# Patient Record
Sex: FEMALE | Race: WHITE
[De-identification: names, ages, dates, MRNs, and addresses within clinical notes are randomized per-mention and may not be internally consistent; named-entity substitution may affect disease eponyms.]

---

## 2018-07-10 ENCOUNTER — HOSPITAL ENCOUNTER (OUTPATIENT)
Dept: HOSPITAL 25 - ED | Age: 68
Setting detail: OBSERVATION
LOS: 1 days | Discharge: HOME | End: 2018-07-11
Attending: INTERNAL MEDICINE | Admitting: HOSPITALIST
Payer: MEDICARE

## 2018-07-10 DIAGNOSIS — Z79.82: ICD-10-CM

## 2018-07-10 DIAGNOSIS — R51: ICD-10-CM

## 2018-07-10 DIAGNOSIS — B19.20: ICD-10-CM

## 2018-07-10 DIAGNOSIS — F32.9: ICD-10-CM

## 2018-07-10 DIAGNOSIS — E11.65: ICD-10-CM

## 2018-07-10 DIAGNOSIS — E03.9: ICD-10-CM

## 2018-07-10 DIAGNOSIS — Z79.4: ICD-10-CM

## 2018-07-10 DIAGNOSIS — Z79.899: ICD-10-CM

## 2018-07-10 DIAGNOSIS — E86.0: Primary | ICD-10-CM

## 2018-07-10 DIAGNOSIS — Z88.8: ICD-10-CM

## 2018-07-10 DIAGNOSIS — G47.33: ICD-10-CM

## 2018-07-10 DIAGNOSIS — J44.9: ICD-10-CM

## 2018-07-10 DIAGNOSIS — K74.60: ICD-10-CM

## 2018-07-10 DIAGNOSIS — I10: ICD-10-CM

## 2018-07-10 DIAGNOSIS — N17.9: ICD-10-CM

## 2018-07-10 PROCEDURE — 94640 AIRWAY INHALATION TREATMENT: CPT

## 2018-07-10 PROCEDURE — 83036 HEMOGLOBIN GLYCOSYLATED A1C: CPT

## 2018-07-10 PROCEDURE — 99284 EMERGENCY DEPT VISIT MOD MDM: CPT

## 2018-07-10 PROCEDURE — 85610 PROTHROMBIN TIME: CPT

## 2018-07-10 PROCEDURE — 80053 COMPREHEN METABOLIC PANEL: CPT

## 2018-07-10 PROCEDURE — 96361 HYDRATE IV INFUSION ADD-ON: CPT

## 2018-07-10 PROCEDURE — 80048 BASIC METABOLIC PNL TOTAL CA: CPT

## 2018-07-10 PROCEDURE — 36415 COLL VENOUS BLD VENIPUNCTURE: CPT

## 2018-07-10 PROCEDURE — 83735 ASSAY OF MAGNESIUM: CPT

## 2018-07-10 PROCEDURE — 96360 HYDRATION IV INFUSION INIT: CPT

## 2018-07-10 PROCEDURE — G0378 HOSPITAL OBSERVATION PER HR: HCPCS

## 2018-07-10 PROCEDURE — 85730 THROMBOPLASTIN TIME PARTIAL: CPT

## 2018-07-10 PROCEDURE — 71045 X-RAY EXAM CHEST 1 VIEW: CPT

## 2018-07-10 PROCEDURE — 85025 COMPLETE CBC W/AUTO DIFF WBC: CPT

## 2018-07-10 PROCEDURE — 93005 ELECTROCARDIOGRAM TRACING: CPT

## 2018-07-11 VITALS — SYSTOLIC BLOOD PRESSURE: 162 MMHG | DIASTOLIC BLOOD PRESSURE: 64 MMHG

## 2018-07-11 LAB
BASOPHILS # BLD AUTO: 0 10^3/UL (ref 0–0.2)
EOSINOPHIL # BLD AUTO: 0.1 10^3/UL (ref 0–0.6)
HCT VFR BLD AUTO: 32 % (ref 35–47)
HGB BLD-MCNC: 10.5 G/DL (ref 12–16)
INR PPP/BLD: 1.02 (ref 0.77–1.02)
LYMPHOCYTES # BLD AUTO: 1.1 10^3/UL (ref 1–4.8)
MCH RBC QN AUTO: 26 PG (ref 27–31)
MCHC RBC AUTO-ENTMCNC: 32 G/DL (ref 31–36)
MCV RBC AUTO: 80 FL (ref 80–97)
MONOCYTES # BLD AUTO: 0.4 10^3/UL (ref 0–0.8)
NEUTROPHILS # BLD AUTO: 3.6 10^3/UL (ref 1.5–7.7)
NRBC # BLD AUTO: 0 10^3/UL
NRBC BLD QL AUTO: 0
PLATELET # BLD AUTO: 136 10^3/UL (ref 150–450)
RBC # BLD AUTO: 4.06 10^6/UL (ref 4–5.4)
WBC # BLD AUTO: 5.2 10^3/UL (ref 3.5–10.8)

## 2018-07-11 RX ADMIN — NYSTATIN SCH APPLIC: 100000 POWDER TOPICAL at 10:37

## 2018-07-11 RX ADMIN — NYSTATIN SCH APPLIC: 100000 POWDER TOPICAL at 05:53

## 2018-07-11 RX ADMIN — NYSTATIN SCH: 100000 POWDER TOPICAL at 14:54

## 2018-07-11 NOTE — RAD
HISTORY: SOB



COMPARISONS: October 04, 2016



VIEWS: 1: frontal portable view of the chest at 1:21 AM



FINDINGS:

LINES AND TUBES: None.

CARDIOMEDIASTINAL SILHOUETTE: The cardiomediastinal silhouette is normal for portable

technique.

PLEURA: The costophrenic angles are sharp. No pleural abnormalities are noted.

LUNG PARENCHYMA: The lungs are clear.

ABDOMEN: The upper abdomen is clear. There is no subphrenic gas.

BONES AND SOFT TISSUES: Degenerative changes are noted along the spine.



IMPRESSION: NO ACTIVE CARDIOPULMONARY DISEASE.



R0
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
## 2018-07-11 NOTE — ED
Neurological HPI





- HPI Summary


HPI Summary: 


Pt is 66 y/o female p/w R sided facial and thigh numbness c/o throbbing pain 

located on R-side of face radiating to the temple. Upon arrival, pain was rated 

an 8/10. Original pain onset last night and she has no prior similar episodes. 

Associated Sx:Pt denies fever, itching of abdominal region (erythema noted), 

chills. PMHx: Chronic hip problems, IDDM, CVA. Pt did not take gabapentin today 

and she uses a walker to ambulate regularly.





- History of Current Complaint


Chief Complaint: EDGeneral


Stated Complaint: FACIAL NUMBNESS


Time Seen by Provider: 07/10/18 23:25


Hx Obtained From: Patient


Onset/Duration: Started hours ago - Last night, Still Present


Current Severity: Severe


Neurological Deficit Location: Facial - R-sided numbness, RLE - Thigh numbness


Pain Intensity: 8


Pain Scale Used: 0-10 Numeric


Character: Throbbing


Aggravating: Nothing


Alleviating: Nothing


Associated Signs and Symptoms: Positive: Pain - R-side of face





- Additional Pertinent History


Primary Care Physician: [id number]





- Allergy/Home Medications


Allergies/Adverse Reactions: 


 Allergies











Allergy/AdvReac Type Severity Reaction Status Date / Time


 


MS Amoxicillin Allergy Mild rash, hives Verified 06/21/16 17:36





[From Augmentin]     


 


MS Cephalosporins Allergy Mild hives, rash Verified 06/21/16 17:36





[Cephalosporins]     


 


MS Clavulanic Acid Allergy Mild hives, rash Verified 06/21/16 17:36





[From Augmentin]     


 


MS Carbamazepine Allergy Unknown Unknown Verified 06/21/16 17:36





[From Tegretol]   Reaction  





   Details  


 


MS Metformin [Metformin] Allergy Unknown Unknown Verified 06/21/16 17:36





   Reaction  





   Details  














PMH/Surg Hx/FS Hx/Imm Hx


Previously Healthy: No


Endocrine/Hematology History: Reports: Hx Blood Transfusions, Hx Diabetes


   Denies: Hx Anticoagulant Therapy, Hx Blood Disorders, Hx Bone Marrow Disease

, Hx Systemic Lupus Erythematosus, Hx Sickle Cell Disease, Hx Thyroid Disease, 

Hx Anemia, Hx Unexplained Bleeding, Other Endocrine/Hematological Disorders


Cardiovascular History: Reports: Hx Cardiac Arrest, Hx Congestive Heart Failure

, Hx Coronary Artery Disease, Hx Hypercholesterolemia, Hx Hypertension, Hx 

Peripheral Vascular Disease, Other Cardiovascular Problems/Disorders - CAD


   Denies: Hx Aneurysm, Hx Angina, Hx Angioplasty, Hx Auto Implanted Cardiovert 

Defib, Hx Cardiomegaly, Hx Congenital Heart Disease, Hx Deep Vein Thrombosis, 

Hx Hypotension, Hx Myocardial Infarction, Hx Pacemaker/ICD, Hx Rheumatic Fever, 

Hx Syncope, Hx Valvular Heart Disease


Respiratory History: Reports: Hx Asthma, Hx Chronic Bronchitis, Hx Chronic 

Obstructive Pulmonary Disease (COPD), Hx Pneumonia, Hx Sleep Apnea, Other 

Respiratory Problems/Disorders - PNA


   Denies: Hx Cystic Fibrosis, Hx Lung Cancer, Hx Pleural Effusion, Hx 

Pulmonary Edema, Hx Pulmonary Embolism, Hx Seasonal Allergies


GI History: Reports: Hx Cirrhosis - HEPATITIS CIRRHOSIS, Hx Gall Bladder Disease


   Denies: Hx Crohn's Disease, Hx Diverticulosis, Hx Gastroesophageal Reflux 

Disease, Hx Gastrointestinal Bleed, Hx Hiatal Hernia, Hx Jaundice, Hx 

Obstructive Bowel, Hx Ileostomy, Hx Pyloric Stenosis, Hx Ulcer, Other GI 

Disorders


 History: 


   Denies: Hx Acute Renal Failure, Hx Benign Prostatic Hyperplasia, Hx Chronic 

Renal Failure, Hx Dialysis, Hx Kidney Infection, Hx Kidney Stones, Hx Renal 

Disease, Other  Problems/Disorders


Musculoskeletal History: Reports: Hx Arthritis - RIGHT HIP, Hx Back Problems


   Denies: Hx Rheumatoid Arthritis, Hx Bursitis, Hx Congenital Bone 

Abnormalities, Hx Fibromyalgia, Hx Gout, Hx Orthopedic Injury, Hx Osteoporosis, 

Hx Scoliosis, Hx Tendonitis, Other Musculoskeletal History


Sensory History: Reports: Hx Cataracts, Hx Contacts or Glasses, Hx Hearing Aid


   Denies: Hx Eye Injury, Hx Eye Prosthesis, Hx Glaucoma, Hx Macular 

Degeneration, Hx Vision Problem, Hx Deafness, Hx Hearing Problem, Other Sensory 

Impairments


Opthamlomology History: Reports: Hx Cataracts, Hx Contacts or Glasses


   Denies: Hx Eye Injury, Hx Eye Prosthesis, Hx Glaucoma, Hx Macular 

Degeneration, Hx Vision Problem, Other Sensory Impairments


Neurological History: Reports: Hx Nerve Disease - trigeminal neuralgia, Hx 

Transient Ischemic Attacks (TIA) - R/O


   Denies: Hx Dementia, Hx Developmental Delay, Hx Headaches, Hx Migraine, Hx 

Seizures, Hx Spinal Cord Injury, Other Neuro Impairments/Disorders


Psychiatric History: Reports: Hx Depression


   Denies: Hx Anxiety, Hx Attention Deficit Hyperactivity Disorder, Hx Eating 

Disorder, Hx Panic Disorder, Hx Post Traumatic Stress Disorder, Hx Inpatient 

Treatment, Hx Community Mental Health Tx, Hx Schizophrenia, Hx Bipolar Disorder

, Hx Suicide Attempt, Hx of Violent Episodes Against Others, Hx Substance Abuse

, Other Psychiatric Issues/Disorders





- Cancer History


Hx Chemotherapy: No





- Surgical History


Surgery Procedure, Year, and Place: hysterectomy, right leg skin graft,shonda, 

cataract


Hx Anesthesia Reactions: No





- Immunization History


Date of Tetanus Vaccine: Unknown


Date of Influenza Vaccine: Fall 2012


Infectious Disease History: No


Infectious Disease History: Reports: Hx Hepatitis - hep c, Hx of Known/

Suspected MRSA


   Denies: Hx Clostridium Difficile, Hx Human Immunodeficiency Virus (HIV), Hx 

Shingles, Hx Tuberculosis, Hx Known/Suspected VRE, Hx Known/Suspected VRSA, 

History Other Infectious Disease, Traveled Outside the [place] in Last 30 Days





- Family History


Known Family History: Positive: Cardiac Disease, Diabetes





- Social History


Occupation: Unemployed, Disabled


Lives: With Family - daughter


Alcohol Use: None


Hx Substance Use: No


Substance Use Type: Reports: None


Hx Tobacco Use: No


Smoking Status (MU): Never Smoked Tobacco


Have You Smoked in the Last Year: No





Review of Systems


Negative: Fever, Chills


Positive: Other - R sided facial pain radiating to the temple


Positive: Other - Erythema on abdomen without pruritis


Positive: Numbness - R-sided facial and thigh numbness


All Other Systems Reviewed And Are Negative: Yes





Physical Exam





- Summary


Physical Exam Summary: 


VITAL SIGNS: Reviewed.


GENERAL: Patient is a well-developed, obese female who is lying comfortable in 

the stretcher. Patient is not in any acute respiratory distress.


HEAD AND FACE: No signs of trauma. No ecchymosis, hematomas or skull 

depressions. R-side of face tender to palpation. 


EYES: PERRLA, EOMI x 2, No injected conjunctiva, no nystagmus.


EARS: Hearing grossly intact. Ear canals and tympanic membranes are within 

normal limits.


MOUTH: Oropharynx within normal limits.


NECK: Supple, trachea is midline, no adenopathy, no JVD, no carotid bruit, no c-

spine tenderness, neck with full ROM.


CHEST: Symmetric, no tenderness at palpation


LUNGS: Clear to auscultation bilaterally. No wheezing or crackles.


CVS: Regular rate and rhythm, S1 and S2 present, no murmurs or gallops 

appreciated.


ABDOMEN: Soft, non-tender, erythematous area on the R-side of abd that is not 

tender. No signs of distention. No rebound no guarding, and no masses palpated. 

Bowel sounds are normal.


EXTREMITIES: FROM in all major joints, no edema, no cyanosis or clubbing.


NEURO: Alert and oriented x 3. No acute neurological deficits. Speech is normal 

and follows commands. Grossly nonfocal. 


SKIN: Dry and warm. 


 


Triage Information Reviewed: Yes


Vital Signs On Initial Exam: 


 Initial Vitals











Temp Pulse Resp BP Pulse Ox


 


 98.7 F   81   20   176/69   93 


 


 07/10/18 23:26  07/10/18 23:26  07/10/18 23:26  07/10/18 23:26  07/10/18 23:26











Vital Signs Reviewed: Yes





Diagnostics





- Vital Signs


 Vital Signs











  Temp Pulse Resp BP Pulse Ox


 


 07/10/18 23:26  98.7 F  81  20  176/69  93














- Laboratory


Result Diagrams: 


 07/11/18 00:33





 07/11/18 00:33


Lab Statement: Any lab studies that have been ordered have been reviewed, and 

results considered in the medical decision making process.





- Radiology


  ** CXR


Xray Interpretation: No Acute Changes - No acute process. Pending official 

report.


Radiology Interpretation Completed By: ED Physician





- EKG


  ** 0134


Cardiac Rate: NL - 69 bpm


EKG Rhythm: Sinus Rhythm


EKG Interpretation: nml axis, nml interval, no ischemic changes.





Course/Dx





- Course


Assessment/Plan: Pt is 66 y/o female p/w R sided facial and thigh numbness c/o 

throbbing pain located on R-side of face radiating to the temple. Upon arrival, 

pain was rated an 8/10. Original pain onset last night and she has no prior 

similar episodes. Associated Sx:Pt denies fever, itching of abdominal region (

erythema noted), chills. CXR reveals negative results.  EKG reveals sinus 

rhythm w no ischemic changes. In the ED course, pt was given Insulin and 

Percocet. Patient will be admitted to [facility]. Pt is agreeable with this plan.





- Diagnoses


Provider Diagnoses: 


 Hyperglycemia








- Physician Notifications


Discussed Care Of Patient With: Sajan Hairston


Time Discussed With Above Provider: 01:40


Instructed by Provider To: Other - Accept pt for admission





Discharge





- Sign-Out/Discharge


Documenting (check all that apply): Patient Departure - Admit





- Discharge Plan


Condition: Fair


Disposition: ADMITTED TO [facility]


Referrals: 


Colton Norris MD [Primary Care Provider] -

## 2018-07-12 NOTE — DS
CC:  Dr. Norris.*

 

DISCHARGE SUMMARY:

 

DATE OF ADMISSION:  07/11/18

 

DATE OF DISCHARGE:  07/11/18

 

PRIMARY CARE PROVIDER:  Dr. Norris.

 

PRIMARY DIAGNOSES:

1.  Right facial pain.

2.  Hyperglycemia.

 

SECONDARY DIAGNOSES:  Include:

1.  Insulin dependent type 2 diabetes mellitus.

2.  Chronic obstructive pulmonary disease.

3.  Hypothyroidism.

4.  Hypertension.

5.  Obstructive sleep apnea.

6.  Depression.

 

MEDICATIONS ON DISCHARGE:  Unchanged from admission include:

1.  Zoloft 50 mg daily.

2.  Zofran 4 mg every 8 hours as needed.

3.  Nystatin cream daily.

4.  Multivitamin daily.

5.  Levothyroxine 25 mcg daily.

6.  Lactulose 30 mL 3 times daily.

7.  Insulin detemir 45 units in the morning and 50 units at night.

8.  Insulin aspart 28 units 2 times a day with meals.

9.  Narco 5/325 3 times a day as needed.

10.  Gabapentin 600 mg at bedtime.

11.  Advair 250/50 1 puff twice daily.

12.  Docusate 100 mg twice daily.

13.  Clopidogrel 75 mg daily.

14.  Caltrate 1 tab twice daily.

15.  Bisoprolol 10 mg daily.

16.  Atorvastatin 40 mg daily.

17.  Aspirin 81 mg daily.

18.  ______ ipratropium 1 neb 4 times a day as needed.

19.  Albuterol HFA 1 to 2 puffs every 4 to 6 hours as needed.

20.  Oxycodone 5 mg every 4 hours as needed.

21.  Amlodipine 10 mg daily.

22.  Vitamin E 400 mg daily.

23.  Torsemide 20 mg twice daily.

24.  Tiotropium 1 puff inhaled daily.

25.  Spironolactone 25 mg daily.

 

PERTINENT LABORATORY DATA:  Hemoglobin A1c 9.9.  Glucose on presentation 560, 
declined to 151 the following morning, creatinine on presentation 1.81, on 
discharge 1.49.

 

HISTORY OF PRESENT ILLNESS AND HOSPITAL COURSE:  This is a 67-year-old female 
with past medical history as outlined in the history of present illness on the 
day of admission, presented to the hospital with right facial pain, was found 
to have significant hyperglycemia as noted above.  She was given a Percocet 
after which her pain resolved and was treated with insulin for her 
hyperglycemia.  Of note, she denies missing any doses of insulin and indicates 
steady dietary habits without changes.  She does have home health aides 8 hours 
a day and her daughter helps with her medications.  It is unclear, how reliable 
the patient's history is regarding her consistent diet and medication 
administration given she needs significant help at home.  In any case, her 
hemoglobin A1c indicated uncontrolled diabetes.  The patient notes her daily 
fingersticks are typically in the 300 to 400s, which makes 500 not greatly 
different than her baseline.  She was restarted on her home regimen of insulin 
and this was discussed with patient.  She is to follow up with Dr. Norris 
tomorrow to continue further discussions regarding tighter diabetes control. It 
is unclear what caused her transient pain in her right side of her face.  She 
had no neurological symptoms and symptoms resolved with Percocet making central 
etiologies less likely.  She was noted to have evidence of acute kidney injury, 
which resolved after 3 L of normal saline.  Her discharge creatinine was 1.49. 
There were no complications during the patient's hospital stay.

 

At followup please;

1.  Continue diabetes control.

2.  Of note, the patient was hospitalized in 2016 with abdominal pain, had a CT 
abdomen and pelvis notable for a pancreatic cyst with the differential of a 
neoplasm.  At that time period, additional imaging including an MRI or 
ultrasound was recommended in 2016.  I see no evidence that this has been 
completed in our system. Please follow up accordingly.

3.  No other specific labs or vitals that need followup.

 

Reasons to return to the hospital include, but not limited to, recurrent or 
worsening symptoms including transient isolated pain or neurological symptoms, 
chest pain, shortness of breath, nausea, vomiting, lightheadedness, loss of 
consciousness or near loss of consciousness, inability to tolerate food, 
bleeding from any source were discussed with the patient.  She acknowledged 
understanding.

 

TIME SPENT:  Greater than 45 minutes were spent on discharge of the patient, 
greater than half was spent face-to-face with the patient.

 

 [id number]/CPS #: [id number]

MAGGIE

## 2019-01-31 ENCOUNTER — HOSPITAL ENCOUNTER (EMERGENCY)
Dept: HOSPITAL 25 - ED | Age: 69
LOS: 1 days | Discharge: HOME | End: 2019-02-01
Payer: MEDICARE

## 2019-01-31 DIAGNOSIS — I25.2: ICD-10-CM

## 2019-01-31 DIAGNOSIS — K22.6: Primary | ICD-10-CM

## 2019-01-31 DIAGNOSIS — Z88.0: ICD-10-CM

## 2019-01-31 DIAGNOSIS — K92.0: ICD-10-CM

## 2019-01-31 DIAGNOSIS — R11.10: ICD-10-CM

## 2019-01-31 DIAGNOSIS — I25.10: ICD-10-CM

## 2019-01-31 PROCEDURE — 96375 TX/PRO/DX INJ NEW DRUG ADDON: CPT

## 2019-01-31 PROCEDURE — 85610 PROTHROMBIN TIME: CPT

## 2019-01-31 PROCEDURE — 85025 COMPLETE CBC W/AUTO DIFF WBC: CPT

## 2019-01-31 PROCEDURE — 83605 ASSAY OF LACTIC ACID: CPT

## 2019-01-31 PROCEDURE — 84484 ASSAY OF TROPONIN QUANT: CPT

## 2019-01-31 PROCEDURE — 85730 THROMBOPLASTIN TIME PARTIAL: CPT

## 2019-01-31 PROCEDURE — 36415 COLL VENOUS BLD VENIPUNCTURE: CPT

## 2019-01-31 PROCEDURE — 82272 OCCULT BLD FECES 1-3 TESTS: CPT

## 2019-01-31 PROCEDURE — 83690 ASSAY OF LIPASE: CPT

## 2019-01-31 PROCEDURE — 80053 COMPREHEN METABOLIC PANEL: CPT

## 2019-01-31 PROCEDURE — 99283 EMERGENCY DEPT VISIT LOW MDM: CPT

## 2019-01-31 PROCEDURE — 74018 RADEX ABDOMEN 1 VIEW: CPT

## 2019-01-31 PROCEDURE — 96374 THER/PROPH/DIAG INJ IV PUSH: CPT

## 2019-01-31 NOTE — ED
GI/ HPI





- HPI Summary


HPI Summary: 





This patient is a 68 year old female brought in by EMS to [facility] with a cc of 

hematemesis described as streaks of blood in the vomit. She denies abd pain and 

diarrhea. She has not vomited a lot today and only had two episode of blood in 

her vomit. 


Hx COPD 








- History of Current Complaint


Chief Complaint: EDNauseaVomitDiarrh


Time Seen by Provider: 01/31/19 23:33


Stated Complaint: VOMITING


Hx Obtained From: Patient


Onset/Duration: Still Present


Timing: Constant


Severity: Moderate


Current Severity: Moderate


Pain Intensity: 0


Location of Pain: None


Associated Signs and Symptoms: Positive: Hematemesis.  Negative: Diarrhea, Fever





- Additional Pertinent History


Primary Care Physician: DEV





- Allergy/Home Medications


Allergies/Adverse Reactions: 


 Allergies











Allergy/AdvReac Type Severity Reaction Status Date / Time


 


amoxicillin Allergy  See Comment Verified 01/12/19 13:30


 


carbamazepine Allergy  Unknown Verified 01/12/19 13:30





   Reaction  





   Details  


 


Cephalosporins Allergy  See Comment Verified 01/12/19 13:30


 


clavulanic acid Allergy  See Comment Verified 01/12/19 13:30


 


metformin Allergy  Unknown Verified 01/12/19 13:30





   Reaction  





   Details  














PMH/Surg Hx/FS Hx/Imm Hx


Endocrine/Hematology History: Reports: Hx Blood Transfusions, Hx Diabetes


   Denies: Hx Anticoagulant Therapy, Hx Blood Disorders, Hx Bone Marrow Disease

, Hx Systemic Lupus Erythematosus, Hx Sickle Cell Disease, Hx Thyroid Disease, 

Hx Anemia, Hx Unexplained Bleeding, Other Endocrine/Hematological Disorders


Cardiovascular History: Reports: Hx Cardiac Arrest, Hx Congestive Heart Failure

, Hx Coronary Artery Disease, Hx Hypercholesterolemia, Hx Hypertension, Hx 

Peripheral Vascular Disease, Other Cardiovascular Problems/Disorders - CAD


   Denies: Hx Aneurysm, Hx Angina, Hx Angioplasty, Hx Auto Implanted Cardiovert 

Defib, Hx Cardiomegaly, Hx Congenital Heart Disease, Hx Deep Vein Thrombosis, 

Hx Hypotension, Hx Myocardial Infarction, Hx Pacemaker/ICD, Hx Rheumatic Fever, 

Hx Syncope, Hx Valvular Heart Disease


Respiratory History: Reports: Hx Asthma, Hx Chronic Bronchitis, Hx Chronic 

Obstructive Pulmonary Disease (COPD), Hx Pneumonia, Hx Sleep Apnea, Other 

Respiratory Problems/Disorders - PNA


   Denies: Hx Cystic Fibrosis, Hx Lung Cancer, Hx Pleural Effusion, Hx 

Pulmonary Edema, Hx Pulmonary Embolism, Hx Seasonal Allergies


GI History: Reports: Hx Cirrhosis - HEPATITIS CIRRHOSIS, Hx Gall Bladder Disease


   Denies: Hx Crohn's Disease, Hx Diverticulosis, Hx Gastroesophageal Reflux 

Disease, Hx Gastrointestinal Bleed, Hx Hiatal Hernia, Hx Jaundice, Hx 

Obstructive Bowel, Hx Ileostomy, Hx Pyloric Stenosis, Hx Ulcer, Other GI 

Disorders


 History: 


   Denies: Hx Acute Renal Failure, Hx Benign Prostatic Hyperplasia, Hx Chronic 

Renal Failure, Hx Dialysis, Hx Kidney Infection, Hx Kidney Stones, Hx Renal 

Disease, Other  Problems/Disorders


Musculoskeletal History: Reports: Hx Arthritis - RIGHT HIP, Hx Back Problems


   Denies: Hx Rheumatoid Arthritis, Hx Bursitis, Hx Congenital Bone 

Abnormalities, Hx Fibromyalgia, Hx Gout, Hx Orthopedic Injury, Hx Osteoporosis, 

Hx Scoliosis, Hx Tendonitis, Other Musculoskeletal History


Sensory History: Reports: Hx Cataracts, Hx Contacts or Glasses, Hx Hearing Aid


   Denies: Hx Eye Injury, Hx Eye Prosthesis, Hx Glaucoma, Hx Macular 

Degeneration, Hx Vision Problem, Hx Deafness, Hx Hearing Problem, Other Sensory 

Impairments


Opthamlomology History: Reports: Hx Cataracts, Hx Contacts or Glasses


   Denies: Hx Eye Injury, Hx Eye Prosthesis, Hx Glaucoma, Hx Macular 

Degeneration, Hx Vision Problem, Other Sensory Impairments


Neurological History: Reports: Hx Nerve Disease - trigeminal neuralgia, Hx 

Transient Ischemic Attacks (TIA) - R/O


   Denies: Hx Dementia, Hx Developmental Delay, Hx Headaches, Hx Migraine, Hx 

Seizures, Hx Spinal Cord Injury, Other Neuro Impairments/Disorders


Psychiatric History: Reports: Hx Depression


   Denies: Hx Anxiety, Hx Attention Deficit Hyperactivity Disorder, Hx Eating 

Disorder, Hx Panic Disorder, Hx Post Traumatic Stress Disorder, Hx Inpatient 

Treatment, Hx Community Mental Health Tx, Hx Schizophrenia, Hx Bipolar Disorder

, Hx Suicide Attempt, Hx of Violent Episodes Against Others, Hx Substance Abuse

, Other Psychiatric Issues/Disorders





- Cancer History


Hx Chemotherapy: No





- Surgical History


Surgery Procedure, Year, and Place: hysterectomy, right leg skin graft,shonda, 

cataract


Hx Anesthesia Reactions: No





- Immunization History


Date of Tetanus Vaccine: Unknown


Date of Influenza Vaccine: Fall 2012


Infectious Disease History: No


Infectious Disease History: Reports: Hx Hepatitis - hep c, Hx of Known/

Suspected MRSA


   Denies: Hx Clostridium Difficile, Hx Human Immunodeficiency Virus (HIV), Hx 

Shingles, Hx Tuberculosis, Hx Known/Suspected VRE, Hx Known/Suspected VRSA, 

History Other Infectious Disease, Traveled Outside the [place] in Last 30 Days





- Family History


Known Family History: Positive: Cardiac Disease, Diabetes





- Social History


Alcohol Use: None


Hx Substance Use: No


Substance Use Type: Reports: None


Hx Tobacco Use: No


Smoking Status (MU): Never Smoked Tobacco


Have You Smoked in the Last Year: No





Review of Systems


Negative: Fever


Positive: Vomiting.  Negative: Abdominal Pain, Diarrhea


All Other Systems Reviewed And Are Negative: Yes





Physical Exam





- Summary


Physical Exam Summary: 





Appearance: Well appearing, no pain distress


Skin: warm, dry, reflects adequate perfusion


Head/face: normal


Eyes: EOMI, FAINA


ENT: normal


Neck: supple, non-tender


Respiratory: CTA, breath sounds present


Cardiovascular: RRR, pulses symmetrical  


Abdomen: non-tender, soft


Musculoskeletal: normal, strength/ROM intact


Neuro: normal, sensory motor intact, A&Ox3


Rectal: brown stool no blood 





Triage Information Reviewed: Yes


Vital Signs On Initial Exam: 


 Initial Vitals











Pulse Pulse Ox


 


 82   94 


 


 01/31/19 23:29  01/31/19 23:29











Vital Signs Reviewed: Yes





Diagnostics





- Vital Signs


 Vital Signs











  Temp Pulse Resp BP Pulse Ox


 


 01/31/19 23:30  98.3 F  81  20  133/90  95


 


 01/31/19 23:29   82    94














- Laboratory


Result Diagrams: 


 01/31/19 23:56





 01/31/19 23:56


Lab Statement: Any lab studies that have been ordered have been reviewed, and 

results considered in the medical decision making process.





- Radiology


  ** abd xray


Radiology Interpretation Completed By: ED Physician


Summary of Radiographic Findings: No acute disease. Pending official report





GIGU Course/Dx





- Course


Assessment/Plan: This patient is a 68 year old female brought in by EMS to 

[facility] with a cc of hematemesis described as streaks of blood in the vomit. She 

denies abd pain and diarrhea. She has not vomited a lot today and only had two 

episode of blood in her vomit.  Hx COPD.  The patient was given zofran, protonix

, and IV fluids. Bloodwork obtained.  ABD xray showed no acute disease. Pending 

official report.  The patient will be discharged and will f/u with GI





- Diagnoses


Differential Diagnoses - Female: Vomiting


Provider Diagnoses: 


 Vomiting, Shyanne-Kelly syndrome








Discharge





- Sign-Out/Discharge


Documenting (check all that apply): Patient Departure


Patient Received Moderate/Deep Sedation with Procedure: Yes





- Discharge Plan


Condition: Stable


Disposition: HOME


Prescriptions: 


Pantoprazole TAB * [Protonix TAB*] 40 mg PO DAILY #30 tab


Patient Education Materials:  Acute Nausea and Vomiting (ED)


Referrals: 


Colton Norris MD [Primary Care Provider] - 


Pierce Bauer MD [Medical Doctor] - 2 Days


Additional Instructions: 


Follow up with your primary care physician in 1-3 days.


RETURN TO THE EMERGENCY DEPARTMENT FOR CHANGING OR WORSENING SYMPTOMS.


 








- Billing Disposition and Condition


Condition: STABLE


Disposition: Home





- Attestation Statements


Document Initiated by Scribe: Yes


Documenting Scribe: Mateo Pool 


Provider For Whom Scribe is Documenting (Include Credential): Klaus Boykin MD 


Scribe Attestation: 


Mateo CABRAL , scribed for Klaus Boykin MD  on 02/01/19 at 0230. 


Scribe Documentation Reviewed: Yes


Provider Attestation: 


The documentation as recorded by the Mateo paris  accurately reflects 

the service I personally performed and the decisions made by Klaus velazquez MD 


Status of Scribe Document: Viewed

## 2019-02-01 VITALS — DIASTOLIC BLOOD PRESSURE: 76 MMHG | SYSTOLIC BLOOD PRESSURE: 143 MMHG

## 2019-02-01 LAB
ALBUMIN SERPL BCG-MCNC: 3.8 G/DL (ref 3.2–5.2)
ALBUMIN/GLOB SERPL: 1.3 {RATIO} (ref 1–3)
ALP SERPL-CCNC: 79 U/L (ref 34–104)
ALT SERPL W P-5'-P-CCNC: 31 U/L (ref 7–52)
ANION GAP SERPL CALC-SCNC: 7 MMOL/L (ref 2–11)
APTT PPP: 37.2 SECONDS (ref 26–36.3)
AST SERPL-CCNC: 37 U/L (ref 13–39)
BASOPHILS # BLD AUTO: 0 10^3/UL (ref 0–0.2)
BUN SERPL-MCNC: 35 MG/DL (ref 6–24)
BUN/CREAT SERPL: 23.6 (ref 8–20)
CALCIUM SERPL-MCNC: 9.3 MG/DL (ref 8.6–10.3)
CHLORIDE SERPL-SCNC: 102 MMOL/L (ref 101–111)
EOSINOPHIL # BLD AUTO: 0.1 10^3/UL (ref 0–0.6)
GLOBULIN SER CALC-MCNC: 3 G/DL (ref 2–4)
GLUCOSE SERPL-MCNC: 337 MG/DL (ref 70–100)
HCO3 SERPL-SCNC: 28 MMOL/L (ref 22–32)
HCT VFR BLD AUTO: 34 % (ref 35–47)
HGB BLD-MCNC: 10.9 G/DL (ref 12–16)
INR PPP/BLD: 1.08 (ref 0.77–1.02)
LYMPHOCYTES # BLD AUTO: 1.2 10^3/UL (ref 1–4.8)
MCH RBC QN AUTO: 26 PG (ref 27–31)
MCHC RBC AUTO-ENTMCNC: 33 G/DL (ref 31–36)
MCV RBC AUTO: 79 FL (ref 80–97)
MONOCYTES # BLD AUTO: 0.3 10^3/UL (ref 0–0.8)
NEUTROPHILS # BLD AUTO: 3.1 10^3/UL (ref 1.5–7.7)
NRBC # BLD AUTO: 0 10^3/UL
NRBC BLD QL AUTO: 0
PLATELET # BLD AUTO: 139 10^3/UL (ref 150–450)
POTASSIUM SERPL-SCNC: 3.5 MMOL/L (ref 3.5–5)
PROT SERPL-MCNC: 6.8 G/DL (ref 6.4–8.9)
RBC # BLD AUTO: 4.24 10^6/UL (ref 4–5.4)
SODIUM SERPL-SCNC: 137 MMOL/L (ref 135–145)
TROPONIN I SERPL-MCNC: 0.01 NG/ML (ref ?–0.04)
WBC # BLD AUTO: 4.7 10^3/UL (ref 3.5–10.8)

## 2020-01-24 ENCOUNTER — HOSPITAL ENCOUNTER (INPATIENT)
Dept: HOSPITAL 25 - ED | Age: 70
LOS: 8 days | Discharge: HOME HEALTH | DRG: 193 | End: 2020-02-01
Attending: INTERNAL MEDICINE | Admitting: HOSPITALIST
Payer: MEDICARE

## 2020-01-24 DIAGNOSIS — I50.32: ICD-10-CM

## 2020-01-24 DIAGNOSIS — K74.60: ICD-10-CM

## 2020-01-24 DIAGNOSIS — J15.9: ICD-10-CM

## 2020-01-24 DIAGNOSIS — T78.2XXA: ICD-10-CM

## 2020-01-24 DIAGNOSIS — E11.22: ICD-10-CM

## 2020-01-24 DIAGNOSIS — J44.0: ICD-10-CM

## 2020-01-24 DIAGNOSIS — E11.51: ICD-10-CM

## 2020-01-24 DIAGNOSIS — M16.11: ICD-10-CM

## 2020-01-24 DIAGNOSIS — J96.01: ICD-10-CM

## 2020-01-24 DIAGNOSIS — E78.00: ICD-10-CM

## 2020-01-24 DIAGNOSIS — H70.91: ICD-10-CM

## 2020-01-24 DIAGNOSIS — H65.91: ICD-10-CM

## 2020-01-24 DIAGNOSIS — E11.649: ICD-10-CM

## 2020-01-24 DIAGNOSIS — R82.71: ICD-10-CM

## 2020-01-24 DIAGNOSIS — E11.65: ICD-10-CM

## 2020-01-24 DIAGNOSIS — I13.0: ICD-10-CM

## 2020-01-24 DIAGNOSIS — B19.20: ICD-10-CM

## 2020-01-24 DIAGNOSIS — G47.33: ICD-10-CM

## 2020-01-24 DIAGNOSIS — J44.1: ICD-10-CM

## 2020-01-24 DIAGNOSIS — Z88.8: ICD-10-CM

## 2020-01-24 DIAGNOSIS — E03.9: ICD-10-CM

## 2020-01-24 DIAGNOSIS — Z79.890: ICD-10-CM

## 2020-01-24 DIAGNOSIS — E78.5: ICD-10-CM

## 2020-01-24 DIAGNOSIS — Z86.73: ICD-10-CM

## 2020-01-24 DIAGNOSIS — G89.29: ICD-10-CM

## 2020-01-24 DIAGNOSIS — Z88.1: ICD-10-CM

## 2020-01-24 DIAGNOSIS — J10.08: Primary | ICD-10-CM

## 2020-01-24 DIAGNOSIS — R00.1: ICD-10-CM

## 2020-01-24 DIAGNOSIS — Z88.0: ICD-10-CM

## 2020-01-24 DIAGNOSIS — F32.9: ICD-10-CM

## 2020-01-24 DIAGNOSIS — Z79.899: ICD-10-CM

## 2020-01-24 DIAGNOSIS — I25.10: ICD-10-CM

## 2020-01-24 DIAGNOSIS — N18.9: ICD-10-CM

## 2020-01-24 DIAGNOSIS — R62.50: ICD-10-CM

## 2020-01-24 LAB
ALBUMIN SERPL BCG-MCNC: 3.7 G/DL (ref 3.2–5.2)
ALBUMIN/GLOB SERPL: 1.2 {RATIO} (ref 1–3)
ALP SERPL-CCNC: 101 U/L (ref 34–104)
ALT SERPL W P-5'-P-CCNC: 37 U/L (ref 7–52)
ANION GAP SERPL CALC-SCNC: 10 MMOL/L (ref 2–11)
APTT PPP: 38.7 SECONDS (ref 26–38)
AST SERPL-CCNC: 30 U/L (ref 13–39)
BASOPHILS # BLD AUTO: 0 10^3/UL (ref 0–0.2)
BUN SERPL-MCNC: 41 MG/DL (ref 6–24)
BUN/CREAT SERPL: 26.1 (ref 8–20)
CALCIUM SERPL-MCNC: 9.1 MG/DL (ref 8.6–10.3)
CHLORIDE SERPL-SCNC: 100 MMOL/L (ref 101–111)
EOSINOPHIL # BLD AUTO: 0 10^3/UL (ref 0–0.6)
FLUAV RNA SPEC QL NAA+PROBE: POSITIVE
GLOBULIN SER CALC-MCNC: 3 G/DL (ref 2–4)
GLUCOSE SERPL-MCNC: 220 MG/DL (ref 70–100)
HCO3 SERPL-SCNC: 24 MMOL/L (ref 22–32)
HCT VFR BLD AUTO: 31 % (ref 35–47)
HGB BLD-MCNC: 10.4 G/DL (ref 12–16)
INR PPP/BLD: 1.25 (ref 0.82–1.09)
LYMPHOCYTES # BLD AUTO: 0.7 10^3/UL (ref 1–4.8)
MCH RBC QN AUTO: 29 PG (ref 27–31)
MCHC RBC AUTO-ENTMCNC: 34 G/DL (ref 31–36)
MCV RBC AUTO: 85 FL (ref 80–97)
MONOCYTES # BLD AUTO: 0.8 10^3/UL (ref 0–0.8)
NEUTROPHILS # BLD AUTO: 10.2 10^3/UL (ref 1.5–7.7)
NRBC # BLD AUTO: 0 10^3/UL
NRBC BLD QL AUTO: 0
PLATELET # BLD AUTO: 139 10^3/UL (ref 150–450)
POTASSIUM SERPL-SCNC: 4.5 MMOL/L (ref 3.5–5)
PROT SERPL-MCNC: 6.7 G/DL (ref 6.4–8.9)
RBC # BLD AUTO: 3.61 10^6 /UL (ref 3.7–4.87)
RBC UR QL AUTO: (no result)
SODIUM SERPL-SCNC: 134 MMOL/L (ref 135–145)
TROPONIN I SERPL-MCNC: 0.05 NG/ML (ref ?–0.03)
TROPONIN I SERPL-MCNC: 0.06 NG/ML (ref ?–0.03)
WBC # BLD AUTO: 11.8 10^3/UL (ref 3.5–10.8)
WBC UR QL AUTO: (no result)

## 2020-01-24 PROCEDURE — 99284 EMERGENCY DEPT VISIT MOD MDM: CPT

## 2020-01-24 PROCEDURE — 93005 ELECTROCARDIOGRAM TRACING: CPT

## 2020-01-24 PROCEDURE — 83880 ASSAY OF NATRIURETIC PEPTIDE: CPT

## 2020-01-24 PROCEDURE — 80048 BASIC METABOLIC PNL TOTAL CA: CPT

## 2020-01-24 PROCEDURE — 87205 SMEAR GRAM STAIN: CPT

## 2020-01-24 PROCEDURE — 87040 BLOOD CULTURE FOR BACTERIA: CPT

## 2020-01-24 PROCEDURE — 94640 AIRWAY INHALATION TREATMENT: CPT

## 2020-01-24 PROCEDURE — 81003 URINALYSIS AUTO W/O SCOPE: CPT

## 2020-01-24 PROCEDURE — 71045 X-RAY EXAM CHEST 1 VIEW: CPT

## 2020-01-24 PROCEDURE — 85610 PROTHROMBIN TIME: CPT

## 2020-01-24 PROCEDURE — 81015 MICROSCOPIC EXAM OF URINE: CPT

## 2020-01-24 PROCEDURE — 87186 SC STD MICRODIL/AGAR DIL: CPT

## 2020-01-24 PROCEDURE — 87086 URINE CULTURE/COLONY COUNT: CPT

## 2020-01-24 PROCEDURE — 80053 COMPREHEN METABOLIC PANEL: CPT

## 2020-01-24 PROCEDURE — 36415 COLL VENOUS BLD VENIPUNCTURE: CPT

## 2020-01-24 PROCEDURE — 82947 ASSAY GLUCOSE BLOOD QUANT: CPT

## 2020-01-24 PROCEDURE — 85652 RBC SED RATE AUTOMATED: CPT

## 2020-01-24 PROCEDURE — 87641 MR-STAPH DNA AMP PROBE: CPT

## 2020-01-24 PROCEDURE — 99406 BEHAV CHNG SMOKING 3-10 MIN: CPT

## 2020-01-24 PROCEDURE — 85025 COMPLETE CBC W/AUTO DIFF WBC: CPT

## 2020-01-24 PROCEDURE — 84443 ASSAY THYROID STIM HORMONE: CPT

## 2020-01-24 PROCEDURE — 83735 ASSAY OF MAGNESIUM: CPT

## 2020-01-24 PROCEDURE — 85730 THROMBOPLASTIN TIME PARTIAL: CPT

## 2020-01-24 PROCEDURE — 70450 CT HEAD/BRAIN W/O DYE: CPT

## 2020-01-24 PROCEDURE — 87077 CULTURE AEROBIC IDENTIFY: CPT

## 2020-01-24 PROCEDURE — 83605 ASSAY OF LACTIC ACID: CPT

## 2020-01-24 PROCEDURE — 84484 ASSAY OF TROPONIN QUANT: CPT

## 2020-01-24 PROCEDURE — 87070 CULTURE OTHR SPECIMN AEROBIC: CPT

## 2020-01-24 PROCEDURE — 87899 AGENT NOS ASSAY W/OPTIC: CPT

## 2020-01-24 RX ADMIN — ACETAMINOPHEN PRN MG: 325 TABLET ORAL at 20:31

## 2020-01-24 RX ADMIN — GUAIFENESIN SCH MG: 600 TABLET, EXTENDED RELEASE ORAL at 20:30

## 2020-01-24 RX ADMIN — TRAZODONE HYDROCHLORIDE SCH MG: 50 TABLET ORAL at 20:31

## 2020-01-24 RX ADMIN — INSULIN LISPRO SCH UNITS: 100 INJECTION, SOLUTION INTRAVENOUS; SUBCUTANEOUS at 22:09

## 2020-01-24 RX ADMIN — INSULIN GLARGINE SCH UNITS: 100 INJECTION, SOLUTION SUBCUTANEOUS at 20:29

## 2020-01-24 RX ADMIN — GABAPENTIN SCH MG: 400 CAPSULE ORAL at 20:30

## 2020-01-24 RX ADMIN — ENOXAPARIN SODIUM SCH MG: 30 INJECTION SUBCUTANEOUS at 20:28

## 2020-01-24 NOTE — ED
Shortness of Breath





- HPI Summary


HPI Summary: 





This pt is a 70 Y/O F presenting to [facility] with a CC of SOB that started 2 days 

ago that was accompanied with weakness, wheezing, productive cough, and 

increased O2 need. She states that she is feeling worse since the onset and 

reports a low fever of 99.8 F. She reports that she was present at [place] for 

similar symptoms recently and was admitted for 2 days due to COPD Exacerbation 

that was related to a virus. She states that lying flat and any form of deep 

breath aggravates her symptoms. She has a decreased appetite and currently 

sleeps on 3 pillows for alleviation. She denies chills, erythema of eyes, sore 

throat, CP, abdominal pain, N/V, dysuria, hematuria, myalgia, edema, rash, or 

dizziness. She has a pertinent PMHx of COPD, CHF, and CAD. 





- History of Current Complaint


Chief Complaint: EDShortnessOfBreath


Time Seen by Provider: 01/24/20 15:24


Hx Obtained From: Patient


Onset/Duration: Sudden Onset, Lasting Days - 2, Still Present, Worse Since - 

onset


Timing: Constant


Current Severity: Moderate


Dyspnea At: Rest


Aggravating Factors: Deep Breaths, Recumbent Position


Associated Signs & Symptoms: Negative - chills, erythema of eyes, sore throat, 

CP, abdominal pain, N/V, dysuria, hematuria, myalgia, edema, rash, or dizziness

, Cough (Productive), Wheezing, Fever - 99.8 F


Related History: Similar Episode - Seen for COPD exacerbation at [place], 

states it was related to a virus.





- Allergy/Home Medications


Allergies/Adverse Reactions: 


 Allergies











Allergy/AdvReac Type Severity Reaction Status Date / Time


 


amoxicillin Allergy  See Comment Verified 01/24/20 14:26


 


carbamazepine Allergy  Unknown Verified 01/24/20 14:26





   Reaction  





   Details  


 


Cephalosporins Allergy  See Comment Verified 01/24/20 14:26


 


clavulanic acid Allergy  See Comment Verified 01/24/20 14:26


 


metformin Allergy  Unknown Verified 01/24/20 14:26





   Reaction  





   Details  











Home Medications: 


 Home Medications





FLUoxetine CAP* [PROzac CAP*] 20 mg PO DAILY 01/24/20 [History Confirmed 01/24/ 20]


Gabapentin CAP(*) [Neurontin 400 mg CAP(*)] 400 mg PO BEDTIME 01/24/20 [History 

Confirmed 01/24/20]


Insulin Aspart [Novolog Penfill 100 units/ml 5 ml x 3 pens] 0 - 10 units SUBCUT 

AC 01/24/20 [History Confirmed 01/24/20]


Insulin Glargine,Hum.rec.anlog [Toujeo Max Solostar 300 units/ml 2 ml x 3 Pens] 

80 units SUBCUT BID 01/24/20 [History Confirmed 01/24/20]


Nystatin TOP POWDER* 1 applic TOPICAL TID PRN 01/24/20 [History Confirmed 01/24/ 20]


Valsartan TAB* [Diovan TAB*] 160 mg PO DAILY 01/24/20 [History Confirmed 01/24/ 20]


traZODone TAB* [Desyrel TAB*] 50 mg PO BEDTIME 01/24/20 [History Confirmed 01/24 /20]











PMH/Surg Hx/FS Hx/Imm Hx


Previously Healthy: Yes


Endocrine/Hematology History: Reports: Hx Blood Transfusions, Hx Diabetes


   Denies: Hx Anticoagulant Therapy, Hx Blood Disorders, Hx Bone Marrow Disease

, Hx Systemic Lupus Erythematosus, Hx Sickle Cell Disease, Hx Thyroid Disease, 

Hx Anemia, Hx Unexplained Bleeding, Other Endocrine/Hematological Disorders


Cardiovascular History: Reports: Hx Cardiac Arrest, Hx Congestive Heart Failure

, Hx Coronary Artery Disease, Hx Hypercholesterolemia, Hx Hypertension, Hx 

Peripheral Vascular Disease, Other Cardiovascular Problems/Disorders - CAD


   Denies: Hx Aneurysm, Hx Angina, Hx Angioplasty, Hx Auto Implanted Cardiovert 

Defib, Hx Cardiomegaly, Hx Congenital Heart Disease, Hx Deep Vein Thrombosis, 

Hx Hypotension, Hx Myocardial Infarction, Hx Pacemaker/ICD, Hx Rheumatic Fever, 

Hx Syncope, Hx Valvular Heart Disease


Respiratory History: Reports: Hx Asthma, Hx Chronic Bronchitis, Hx Chronic 

Obstructive Pulmonary Disease (COPD), Hx Pneumonia, Hx Sleep Apnea, Other 

Respiratory Problems/Disorders - PNA


   Denies: Hx Cystic Fibrosis, Hx Lung Cancer, Hx Pleural Effusion, Hx 

Pulmonary Edema, Hx Pulmonary Embolism, Hx Seasonal Allergies


GI History: Reports: Hx Cirrhosis - HEPATITIS CIRRHOSIS, Hx Gall Bladder Disease


   Denies: Hx Crohn's Disease, Hx Diverticulosis, Hx Gastroesophageal Reflux 

Disease, Hx Gastrointestinal Bleed, Hx Hiatal Hernia, Hx Jaundice, Hx 

Obstructive Bowel, Hx Ileostomy, Hx Pyloric Stenosis, Hx Ulcer, Other GI 

Disorders


 History: 


   Denies: Hx Acute Renal Failure, Hx Benign Prostatic Hyperplasia, Hx Chronic 

Renal Failure, Hx Dialysis, Hx Kidney Infection, Hx Kidney Stones, Hx Renal 

Disease, Other  Problems/Disorders


Musculoskeletal History: Reports: Hx Arthritis - RIGHT HIP, Hx Back Problems


   Denies: Hx Rheumatoid Arthritis, Hx Bursitis, Hx Congenital Bone 

Abnormalities, Hx Fibromyalgia, Hx Gout, Hx Orthopedic Injury, Hx Osteoporosis, 

Hx Scoliosis, Hx Tendonitis, Other Musculoskeletal History


Sensory History: Reports: Hx Cataracts, Hx Contacts or Glasses, Hx Hearing Aid


   Denies: Hx Eye Injury, Hx Eye Prosthesis, Hx Glaucoma, Hx Macular 

Degeneration, Hx Vision Problem, Hx Deafness, Hx Hearing Problem, Other Sensory 

Impairments


Opthamlomology History: Reports: Hx Cataracts, Hx Contacts or Glasses


   Denies: Hx Eye Injury, Hx Eye Prosthesis, Hx Glaucoma, Hx Macular 

Degeneration, Hx Vision Problem, Other Sensory Impairments


Neurological History: Reports: Hx Nerve Disease - trigeminal neuralgia, Hx 

Transient Ischemic Attacks (TIA) - R/O


   Denies: Hx Dementia, Hx Developmental Delay, Hx Headaches, Hx Migraine, Hx 

Seizures, Hx Spinal Cord Injury, Other Neuro Impairments/Disorders


Psychiatric History: Reports: Hx Depression


   Denies: Hx Anxiety, Hx Attention Deficit Hyperactivity Disorder, Hx Eating 

Disorder, Hx Panic Disorder, Hx Post Traumatic Stress Disorder, Hx Inpatient 

Treatment, Hx Community Mental Health Tx, Hx Schizophrenia, Hx Bipolar Disorder

, Hx Suicide Attempt, Hx of Violent Episodes Against Others, Hx Substance Abuse

, Other Psychiatric Issues/Disorders





- Cancer History


Hx Chemotherapy: No


Hx Radiation Therapy: No





- Surgical History


Surgical History: Yes


Surgery Procedure, Year, and Place: hysterectomy, right leg skin graft,shonda, 

cataract


Hx Anesthesia Reactions: No





- Immunization History


Date of Tetanus Vaccine: Unknown


Date of Influenza Vaccine: Fall 2012


Immunizations Up to Date: Yes


Infectious Disease History: No


Infectious Disease History: Reports: Hx Hepatitis - hep c, Hx of Known/

Suspected MRSA


   Denies: Hx Clostridium Difficile, Hx Human Immunodeficiency Virus (HIV), Hx 

Shingles, Hx Tuberculosis, Hx Known/Suspected VRE, Hx Known/Suspected VRSA, 

History Other Infectious Disease, Traveled Outside the [place] in Last 30 Days





- Family History


Known Family History: Positive: Cardiac Disease, Diabetes





- Social History


Occupation: Retired


Lives: Alone


Alcohol Use: None


Hx Substance Use: No


Substance Use Type: Reports: None


Hx Tobacco Use: No


Smoking Status (MU): Never Smoked Tobacco


Have You Smoked in the Last Year: No





Review of Systems


Positive: Fever - 99.8 F.  Negative: Chills


Negative: Erythema


Negative: Sore Throat


Negative: Chest Pain


Positive: Shortness Of Breath, Cough - productive, Other - wheezing, increased 

O2 need


Negative: Abdominal Pain, Vomiting, Nausea


Negative: dysuria, hematuria


Negative: Myalgia, Edema


Negative: Rash


Neurological: Negative - dizziness


Positive: Weakness


Psychological: Other - decreased appetite 


All Other Systems Reviewed And Are Negative: Yes





Physical Exam





- Summary


Physical Exam Summary: 





Constitutional: Well-developed, Well-nourished, Alert. (-) Distressed, Scar on 

the L shin


Skin: Warm, Dry


HENT: Normocephalic; Atraumatic


Eyes: Conjunctiva normal


Neck: Musculoskeletal ROM normal neck. (-) JVD, (-) Stridor, (-) Tracheal 

deviation


Cardio: Rhythm regular, rate normal, Heart sounds normal; Intact distal pulses; 

The pedal pulses are 2+ and symmetric. Radial pulses are 2+ and symmetric. (-) 

Murmur


Pulmonary/Chest wall: Effort normal. (-) Respiratory distress, Inspiratory and 

expiratory wheezes, wet cough, (-) Rales


Abd: Soft, (-) tenderness, (-) Distension, (-) Guarding, (-) Rebound


Musculoskeletal: (-) Edema


Lymph: (-) Cervical adenopathy


Neuro: Alert, Oriented x3


Psych: Mood and affect Normal





Triage Information Reviewed: Yes


Vital Signs On Initial Exam: 


 Initial Vitals











Temp Pulse Resp BP Pulse Ox


 


 99.8 F   95   20   123/76   95 


 


 01/24/20 14:24  01/24/20 14:24  01/24/20 14:24  01/24/20 14:24  01/24/20 14:24











Vital Signs Reviewed: Yes





Procedures





- Sedation


Patient Received Moderate/Deep Sedation with Procedure: No





Diagnostics





- Vital Signs


 Vital Signs











  Temp Pulse Resp BP Pulse Ox


 


 01/24/20 14:24  99.8 F  95  20  123/76  95














- Laboratory


Result Diagrams: 


 01/24/20 15:24





 01/24/20 15:24


Lab Statement: Any lab studies that have been ordered have been reviewed, and 

results considered in the medical decision making process.





Course/Dx





- Course


Course Of Treatment: This pt is a 70 Y/O F presenting to [facility] with a CC of SOB 

that started 2 days ago that was accompanied with weakness, wheezing, 

productive cough, and increased O2 need. She states that she is feeling worse 

since the onset and reports a low fever of 99.8 F. She reports that she was 

present at [place] for similar symptoms recently and was admitted for 2 days 

due to COPD Exacerbation that was related to a virus. She denies chills, 

erythema of eyes, sore throat, CP, abdominal pain, N/V, dysuria, hematuria, 

myalgia, edema, rash, or dizziness. She has a pertinent PMHx of COPD, CHF, and 

CAD.  Her PE found that she had a Scar on the L shin, and Inspiratory and 

expiratory wheezes with a wet cough.  She has abnormal laboratory values in the 

following areas: WBC 11.8, RBC 3.61, Hgb 10.4, Hct 31, Plt count 139, Absolute 

Neuts 10.2, INR 1.25, APTT 38.7, Chloride 100, Glucose 220, Troponin .06. She 

has more abnormalities in her urine: Specific gravity 1.006, Blood, Squamous 

epith cells, hyaline casts, WBC and RBC.  She also is positive for influenza A 

and has a B-natriuretic peptide count of 551.  She was given a duoneb treatment 

and decadron during her ED course.  Reviewed medical records from [place]. she 

was discharged home on 1/22/2020 with a dx of viral gastroenteritis. There were 

no troponin levels taken during her visit and her WBC was at a 7. There was no 

documented BNP levels. No EKG was taken and she was negative for the Flu and C. 

diff. She was positive for CHF and COPD and instructed to use 2 L of O2 at home.





- Diagnoses


Provider Diagnoses: 


 Influenza A, Hospital acquired PNA, CHF exacerbation, COPD exacerbation








- Physician Notifications


Discussed Care of Patient With: Lyndsay Garrison


Time Discussed With Above Provider: 17:15


Instructed by Provider To: Admit As Inpatient


Admit/Transition Orders Completed By ED Provider: Yes





Discharge ED





- Sign-Out/Discharge


Documenting (check all that apply): Patient Departure - admitted





- Discharge Plan


Condition: Stable


Disposition: ADMITTED TO [facility]


Referrals: 


Colton Norris MD [Primary Care Provider] - 





- Attestation Statements


Document Initiated by Scribe: Yes


Documenting Scribe: Kit Jones


Provider For Whom Scribe is Documenting (Include Credential): Hal Simpson MD 


Scribe Attestation: 


Kit CABRAL, scribed for Hal Simpson MD  on 01/24/20 at 1715. 


Status of Scribe Document: Ready

## 2020-01-24 NOTE — XMS REPORT
Summary of Care

 Created on:2019



Patient:Misty Hylton

Sex:Female

:1950

External Reference #:[id number]





Demographics







 Address  [address] [place] [address]

 

 Mobile Phone  [phone]

 

 Preferred Language  English

 

 Marital Status  Not  or 

 

 Gnosticism Affiliation  Unknown

 

 Race  White

 

 Ethnic Group  Not  or 









Author







 Organization  [facility]

 

 Address  [address] [address]









Support







 Name  Relationship  Address  Phone

 

 Rebeca Herrera  Unavailable  Unavailable  [phone]

 

 Barbie Britt  Unavailable  Unavailable  [phone]









Care Team Providers







 Name  Role  Phone

 

 Jr Colton BRASWELL  Primary Care Provider  [phone]

 

 Gloria Cardenas RN  Unavailable  [phone]

 

 William Alamo MD  Unavailable  [phone]









Reason for Visit







 Reason  Comments

 

 Transitional Care Management  Patient discharged from [facility] on 



   for hypoglycemia.







Encounter Details







 Date  Type  Department  Care Team  Description

 

 2019  Office Visit  [facility]  Colton Norris  Viral 
gastroenteritis (Primary Dx);



     Christy BRASWELL MD



  Moderate dehydration;



     [address]



  [address]



  LLQ abdominal pain;



     [place] [address]



  [place] [address]



  Diabetic hypoglycemia (HCC);



     [phone]



  CKD stage 3 due to type 2 diabetes mellitus (HCC);



       [phone]  Essential hypertension;



       (Fax)  Early onset Alzheimer's dementia without behavioral disturbance (
HCC);



         Panlobular emphysema (HCC);



         Type 2 diabetes mellitus with diabetic neuropathy, with long-term 
current use of insulin (HCC)







Allergies







 Active Allergy  Reactions  Severity  Noted Date  Comments

 

 Amoxicillin-Pot Clavulanate  Rash    2014  

 

 Cephalosporins  Hives    2008  

 

 Metformin  Rash    2014  

 

 Carbamazepine  Rash    2007  



documented as of this encounter (statuses as of 2019)



Medications







 Medication  Sig  Dispensed  Refills  Start  End Date  Status



         Date    

 

 Lancets Does not  1 Each by Does  100 Each  5      Active



 apply  not apply route      6    



 MiscIndications:  THREE TIMES          



 Type 2 diabetes  DAILY. Brand:          



 mellitus without  any E11.9.          



 complication  Insulin          



 (HCC), Type 2  dependent          



 diabetes mellitus            



 with hyperglycemia            



 (HCC)            

 

 Insulin  1 Device by Does  100 Each  5      Active



 Syringe-Needle  not apply route      7    



 U-100 (CAREONE  AS DIRECTED.          



 INSULIN SYRINGE)            



 30G X 1/2" 1 ML            



 Does not apply            



 Misc            

 

 Insulin  Inject 1 Each beneath the skin TWICE DAILY. Diabetes Mellitus  200 
Each  3      Active



 Syringe-Needle  Injects 2 times per day.      7    



 U-100 31G X 3/8" 1            



 ML Does not apply            



 Misc            

 

 Blood Glucose  1 Device by Does  1 Device  0      Active



 Monitor Software  not apply route      8    



 Does not apply  AS DIRECTED.          



 Device  uncontrolled          



   insulin          



   dependent          



   diabetes.          



   Brand: Insurance          



   preferred          

 

 Alcohol Swabs  1 Each by Does  300 Each  5      Active



 (ALCOHOL PADS) 70  not apply route      8    



 % Does not apply  AS NEEDED          



 Pads  (diabetes          



   testing &          



   Insulin adm).          

 

 spironolactone  TAKE ONE TABLET  30 Tab  11      Active



 (ALDACTONE) 25 MG  BY MOUTH EVERY      9    



 Oral Tab  DAY          

 

 atorvastatin  Take 1 Tab by  90 Tab  3      Active



 (LIPITOR) 40 MG  mouth DAILY.      9    



 Oral Tab            

 

 levothyroxine  Take 1 Tab by  30 Tab  11      Active



 (SYNTHROID) 50 MCG  mouth BEFORE      9    



 Oral Tab  BREAKFAST.          

 

 Insulin Pen Needle  Inject 1 Appl  90 Each  11      Active



 (NOVOFINE) 30G X 8  beneath the skin      9    



 MM Does not apply  TWICE DAILY.          



 Misc            

 

 valsartan (DIOVAN)  Take 1 Tab by  30 Tab  5  20  Active



 160 MG Oral Tab  mouth DAILY.      9  20  

 

 bisoprolol  Take 1 Tab by  30 Tab  5      Active



 (ZEBETA) 5 MG Oral  mouth DAILY.      9    



 Tab            

 

 torsemide  TAKE TWO TABLETS  180 Tab  3      Active



 (DEMADEX) 20 MG  BY MOUTH ONCE      9    



 Oral Tab  DAILY          

 

 docusate sodium  TAKE ONE CAPSULE  60 Cap  4      Active



 (COLACE) 100 MG  BY MOUTH TWICE A      9    



 Oral  DAY          



 CapIndications:            



 Constipation            

 

 Incontinence  1 Each by Does  250 Each      Active



 Supply Disposable  not apply route      9    



 Does not apply  Continuous prn          



 MiscIndications:  (incontinence).          



 Incontinence in  Adult briefs for          



 female  Urinary          



   incontinence.          



   Size Extra          



   large. MDD 6          

 

 albuterol-ipratrop  3 mL by  90 vial  11      Active



 ium (DUO-NEB)  Inhalation-SVN      9    



 0.5-2.5 (3) MG/3ML  route EVERY FOUR          



 Inhalation   HOURS AS NEEDED          



 Solution  (shortness of          



   breath).          

 

 Glucose Blood  1 Strip by Does  200 Strip  5      Active



 (FREESTYLE LITE)  not apply route      9    



 In Vitro  FOUR TIMES          



 StripIndications:  DAILY. Freestyle          



 Type 2 diabetes  Lite, E11.9,          



 mellitus with  insulin depend          



 hyperglycemia,            



 unspecified            



 whether long term            



 insulin use ([facility])            

 

 sertraline  TAKE ONE TABLET  30 Tab  5      Active



 (ZOLOFT) 50 MG  BY MOUTH EVERY      9    



 Oral Tab  DAY          

 

 Nystatin [id number]  APPLY THREE  60 g  3      Active



 UNIT/GM Apply  TIMES A DAY AS      9    



 externally Powder  NEEDED FOR LEG          



   RASH          

 

 Insulin Aspart  Inject 5-10  15 mL  5      Active



 (NOVOLOG FLEXPEN)  Units beneath      9    



 100 UNIT/ML  the skin THREE          



 Subcutaneous  TIMES DAILY.          



 Solution            



 Pen-injectorIndica            



 tions: Type 2            



 diabetes mellitus            



 with diabetic            



 neuropathy, with            



 long-term current            



 use of insulin            



 ([facility])            

 

 Insulin Glargine,  Inject 40 Units  15 mL  4      Active



 1 Unit Dial,  beneath the skin      9    



 (TOUJEO SOLOSTAR)  TWICE DAILY.          



 300 UNIT/ML            



 Subcutaneous            



 Solution            



 Pen-injector            

 

 GLUCAGON EMERGENCY  INJECT AS NEEDED  1 Kit  3  20  
Discontinued



 1 MG Injection Kit  FOR SEVERE        



   HYPOGLYCEMIA          

 

 Multiple Vitamin  TAKE ONE TABLET  30 Tab  5  20  Discontinued



 (TAB-A-VANDANA) Oral  BY MOUTH EVERY      9  19  



 Tab  DAY          

 

 nystatin  Apply 1-2  30 g  1  20  Discontinued



 (MYCOSTATIN)  times/day under        



 [id number] UNIT/GM  the breasts.          



 Apply externally            



 CreamIndications:            



 Tinea corporis            

 

 Insulin Aspart  Inject 20 Units  15 mL  5  20  Discontinued



 (NOVOLOG FLEXPEN)  beneath the skin        (Dose



 100 UNIT/ML  THREE TIMES          Adjustment)



 Subcutaneous  DAILY.          



 Solution            



 Pen-injectorIndica            



 tions: Type 2            



 diabetes mellitus            



 with diabetic            



 neuropathy, with            



 long-term current            



 use of insulin            



 ([facility])            

 

 gabapentin  TAKE TWO  180 Cap  3  20  Discontinued



 (NEURONTIN) 300 MG  CAPSULES BY        (Provider



 Oral Cap  MOUTH AT BEDTIME          Discontinued)

 

 Fluticasone  Take  by    0    20  Discontinued



 Furoate-Vilanterol  inhalation.        19  



 100-25 MCG/INH            



 Inhalation AEROSOL            



 POWDER, BREATH            



 ACTIVATED            

 

 Fluticasone  Take 1 Inhaler  1 Each  4  20  Discontinued



 Furoate-Vilanterol  by inhalation      9  19  



 (BREO ELLIPTA)  DAILY.          



 100-25 MCG/INH            



 Inhalation AEROSOL            



 POWDER, BREATH            



 ACTIVATED            

 

 Insulin Glargine,  Inject 100 Units  15 mL  4  20  
Discontinued



 1 Unit Dial,  beneath the skin      9    (Dose



 (TOUJEO SOLOSTAR)  TWICE DAILY.          Adjustment)



 300 UNIT/ML            



 Subcutaneous            



 Solution            



 Pen-injector            



documented as of this encounter (statuses as of 2019)



Active Problems







 Problem  Noted Date

 

 Other specified hypothyroidism  2018

 

 CKD stage 3 due to type 2 diabetes mellitus  2018

 

 Essential hypertension  2017

 

 Early onset Alzheimer's dementia without behavioral disturbance  2017









 Overview: 







 Night time wandering and hallucinations









 Wandering associated with mental disorder  2017

 

 IPMN (intraductal papillary mucinous neoplasm) - multifocal branch duct  2017









 Overview: 



Formatting of this note might be different from the original.



 Initial Presentation: Patient w/ known history of hepatic cirrhosis and 
ongoing abdominal pain. Numerous pancreatic cystic lesions noted on 
surveillance imaging



 Referring Provider: Dr. Nikolas Plascencia



 Primary Care Provider: Colton Norris



 



 Working Diagnosis: Multifocal branch duct IPMN



 Initial Evaluation:



 2015 CT scan (abd/pelvis) - [facility]



  Hepatic cirrhosis



  Splenomegaly



  Multiple pancreatic cystic lesions w/ largest measuring 2.2 cm



 



 2016 CT scan (abd/pelvis) - [facility]



  Hepatic cirrhosis



  Multiple pancreatic cystic lesions w/ progression from previous imaging



 



 2016 CT scan (ad/pelvis) - [facility]



  Hepatic cirrhosis



  Spleen normal in size



  Pancreatic calcifications



  Multiple cystic lesions pancreatic body increasing from previous imaging



 



 2016 MRI -



  Hepatic cirrhosis



  No ductal dilation



  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



   Largest located jct body/tail measuring 3.8 x 3.1 cm



    Multiloculated



    Enhancing septae



    No nodular soft tissue component



   Possible branch-duct IPMN



 



 2016 EUS -



  Multiple cystic lesion throughout pancreas



  No pancreatic duct dilation, no mass/lesions, no parenchymal abnormality



  Largest measures 2.5 x 2.2 cm w/o associated mass



   FNA:



    Cytology: no malignancy



    Mucin stain: not done due to acellular sample



    Amylase: 44



    CEA: 1,064



 



 Lab Results



 Component Value Date



  Ca19-9 40 (H) 11/15/2016



 



 Lab Results



 Component Value Date



  Alpha Fetoprotein Tumor Marker 5.0 11/15/2016



 



 Surgical Management:



 Patient poor surgical candidate



  Cirrhosis - Sherman Machado A



  Poorly controlled diabetic w/ A1c of 10 % (2018)



  Requires greater than 80 hours of home care per week



  Poor short and long-term memory issues



 



 Follow -up evaluation:



 Management of Asymptomatic Neoplastic Pancreatic Cysts, revised 



 Cystic lesion measures < 3 cm w/o worrisome features - repeat MRI 1 year



  (main pancreatic duct dilation or solid component to the cyst)



 If remains stable, increase surveillance every 2 years.



 If no change over 5 year surveillance period, no further imaging indicted.



 



 If cystic lesion > 3 cm w/ worrisome features - EUS w/ cytology analysis



  (main pancreatic duct dilation or solid nodularity within the cyst).



 If concerning cytology and/or 2 worrisome features - surgical evaluation.



 



 3/23/2018 MRI -



  Again there is replacement of the pancreas with innumerable cystic lesions,



   largest and most prominent involving the body and tail region,



    measuring approximately 1.0 to 1.5 cm.



   There are no nodular enhancing soft tissue components.



  No dilatation of the pancreatic duct or peripancreatic lymphadenopathy.



  Stable moderate pancreatic parenchymal atrophy.



  Cystic lesion are most compatible w/ multifocal branch duct IPMN









 Type 2 diabetes mellitus with diabetic neuropathy, with long-term current  01/
15/2016



 use of insulin  

 

 Chronic combined systolic and diastolic heart failure  2015

 

 Mixed urge and stress incontinence  2015









 Overview: 







 Diuretic use.









 BMI 31.0-31.9,adult  2011

 

 Mixed hyperlipidemia  2007

 

 COPD (chronic obstructive pulmonary disease)  









 Overview: 







 Restrictive physiology on PFTs : FEV1/FVC 89%, FEV1 62%, [id number] 44%; 
follow up



 PFTs in 2014 showed normal spirometry with slightly low DLCO.









 Hepatitis C  









 Overview: 







 genotype 1a, hepatitis grade 2-3, periportal fibrosis grade 2, Dr. Bauer









 Non-cardiac chest pain  









 Overview: 







 Normal cath at [facility] 08









 Uncontrolled type 2 diabetes mellitus with insulin therapy  

 

 Osteoarthritis  









 Overview: 







 hip and ankle









 Noturnal Hypoxemia  









 Overview: 







 home O2 started 11/10









 Aortic stenosis  









 Overview: 







 mild in May 2014, EF 65%









 Carotid stenosis  









 Overview: 







 >70% bilateral obstruction in 2014, vascular surgery 2014









 Cirrhosis  









 Overview: 



Formatting of this note might be different from the original.



 macronodular disease with ascites on CT abdomen , splenomegaly and portal 
congestion



 



 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



    2017



 INR:     1.17



 Total Bilirubin:   0.9



 Albumin:    3.9



 Ascites:   No



 Hepatic encephalitis:  ?



 



 



 Sherman- Machado Grade:



 A  5-6



 B  7-9



 C  10-15



 



 Results for MISTY HYLTON (MRN [id number])



  Ref. Range 2015 14:21 11/15/2016 13:25



 Alpha Fetoprotein Tumor Marker Latest Ref Range: <6.1 NG/ML 8.5 (H) 5.0









 Recurrent major depressive disorder, in partial remission  









 Overview: 







 long-standing depression



documented as of this encounter (statuses as of 2019)



Resolved Problems







 Problem  Noted Date  Resolved Date

 

 Nonhealing ulcer of left lower leg  2019

 

 Diabetic hypoglycemia  2017

 

 Unspecified essential hypertension  2007

 

 Asthma with chronic obstructive pulmonary disease (COPD)  2007  10/09/
2014

 

 Unspecified disorder of kidney and ureter  2007  10/09/2014

 

 Trigeminal neuralgia  2007  10/09/2014

 

 Diabetes Mellitus Type 2  2007

 

 ASTHMA CHR OBSTR BRONCHITIS  2007  10/09/2014

 

 Obesity, unspecified  2007  10/09/2014

 

 Erythema nodosum  2007  10/09/2014

 

 Chronic hepatitis C without mention of hepatic coma  2006  10/09/2014

 

 Noturnal Hypoxemia    10/09/2014









 Overview: 







 home O2 started 11/10









 Hypertension    2017

 

 Dyslipidemia    2019

 

 Chronic sinusitis    2017









 Overview: 







 on CT 3/10/09



documented as of this encounter (statuses as of 2019)



Immunizations







 Name  Administration Dates  Next Due

 

 Adacel TdaP  10/04/2006  

 

 Hepatitis B Vaccine Adult  2009, 2009, 2009  

 

 Influenza (IM) Preservative Free  10/02/2009, 10/20/2008  

 

 Influenza Vaccine High Dose  2018, 2018, 2016,  



   2015  

 

 Influenza Vaccine Whole  10/04/2006, 2003  

 

 PNEUMOCOCCAL POLYSACCHARIDE VACCINE  2019, 10/04/2006, 2006  

 

 Pneumococcal Conjugate(13 Valent)  2016  

 

 TDAP Vaccine  10/20/2008  

 

 Tetanus Vaccine  2001  



documented as of this encounter



Social History







 Tobacco Use  Types  Packs/Day  Years Used  Date

 

 Never Smoker        









 Smokeless Tobacco: Never Used      









 Alcohol Use  Drinks/Week  oz/Week  Comments

 

 No  0 Standard drinks or equivalent  0.0  recovering alcoholic









 Sex Assigned at Birth  Date Recorded

 

 Not on file  









 Job Start Date  Occupation  Industry

 

 Not on file  Not on file  Not on file









 Travel History  Travel Start  Travel End









 No recent travel history available.



documented as of this encounter



Last Filed Vital Signs







 Vital Sign  Reading  Time Taken  Comments

 

 Blood Pressure  120/60  2019  1:09 PM EST  

 

 Pulse  61  2019  1:09 PM EST  

 

 Temperature  -  -  

 

 Respiratory Rate  -  -  

 

 Oxygen Saturation  96%  2019  1:09 PM EST  

 

 Inhaled Oxygen Concentration  -  -  

 

 Weight  89.4 kg (197 lb)  2019  1:09 PM EST  

 

 Height  162.6 cm (5' 4")  2019  1:09 PM EST  

 

 Body Mass Index  33.81  2019  1:09 PM EST  



documented in this encounter



Patient Instructions

Patient InstructionsColton Norris MD - 2019  1:20 PM ESTUse the 
toujeo insulin 40 units twice daily

Use novolog premeal according to the plan below:

1. Finger stick &lt;100 no insulin

2. Finger stick 100-150 5 units

3. Finger stick &gt;150 10 units



Finger stick four times daily

If finger stick &lt;100 then drink orange juice or eat some candy



Stop the gabapentin for now



Follow up me or Nina DODGE one month diabetes mellitusElectronically signed 
by Colton Norris MD at 2019  1:35 PM EST

documented in this encounter



Progress Notes

Colton Norris MD - 2019  1:20 PM ESTFormatting of this note might be 
different from the original.

TCM  Statement.

Review of the  hospitalization:

I am seeing for transition of care following hospitalization.

The date of discharge was: 19

The discharge diagnosis was  Diabetes mellitus hypoglycemia, viral 
gastroenteritis and dehydration, copd

Chronic congestive heart failure

She denies nausea and vomitting or diarrhea

She denies further hypoglycemia she does finger stick testing four times daily  
And her basal insulin dose was reduced from 80 units twice daily to 40 units 
twice daily  She uses novolog premeal per sliding scale

I reviewed the discharge summary,  discharge instructions,  and pertinent 
additional documentation obtained during hospitalization.   I reconciled the 
medications.

I  also reviewed the Transition of Care  documentation done by staff.



The tests that were not available at the time of discharge were reviewed.

Additional tests which are not yet available include:  none

Exam

S1 and S2 normal, no murmurs, clicks, gallops or rubs. Regular rate and rhythm. 
Chest is clear; no wheezes or rales. No edema or JVD.

No change in mental status



  ICD-9-CM ICD-10-CM

1. Viral gastroenteritis resolved  008.8 A08.4

2. Moderate dehydration resolved  276.51 E86.0

3. LLQ abdominal pain 789.04 R10.32

4. Diabetic hypoglycemia (HCC) lower dose toujeo 40 units twice daily  And use 
novlog sliding scale finger stick four times daily   250.80 E11.649

5. CKD stage 3 due to type 2 diabetes mellitus (HCC) 250.40 E11.22

 585.3 N18.3

6. Essential hypertension 401.9 I10

7. Early onset Alzheimer's dementia without behavioral disturbance ([facility]) 331.0 
G30.0

 294.10 F02.80

8. Panlobular emphysema ([facility]) 492.8 J43.1

9. Type 2 diabetes mellitus with diabetic neuropathy, with long-term current 
use of insulin (HCC) she feels the neuropathy  Is not better plan to stop 
gabapentin  250.60 E11.40 Insulin Aspart (NOVOLOG FLEXPEN) 100 UNIT/ML 
Subcutaneous Solution Pen-injector

 357.2 Z79.4

 V58.67

 Coordination of care.

- I am satisfied that  appropriate referrals are in place to deal with the 
problems identified during hospitalization,  and that the patient has adequate 
community resources  and support  in place.



I confirmed the patient's understanding of the diagnosis and plan of care.

Specific education that was provided today:

Patient Instructions

Use the toujeo insulin 40 units twice daily

Use novolog premeal according to the plan below:

1. Finger stick &lt;100 no insulin

2. Finger stick 100-150 5 units

3. Finger stick &gt;150 10 units



Finger stick four times daily

If finger stick &lt;100 then drink orange juice or eat some candy



Stop the gabapentin for now



Follow up me or Nina DODGE one month diabetes mellitus



The current and discharge medications were reconciled by me,  today

The source document was hospital discharge summary

Electronically signed by Colton Norris MD at 2019  1:41 PM 
ESTdocumented in this encounter



Plan of Treatment







 Health Maintenance  Due Date  Last Done  Comments

 

 Diabetic Eye Exam  1950    

 

 MEDICARE ANNUAL WELLNESS  1950    



 VISIT      

 

 HEPATITIS A IMMUNIZATION  10/11/1951    



 SERIES (1 of 2 - Risk 2-dose      



 series)      

 

 ZOSTER IMMUNIZATION SERIES  10/11/1998    



 (1 of 2)      

 

 DTaP/Tdap/Td Vaccines (2 -  10/20/2018  10/20/2008  



 Tdap)      

 

 OSTEOPOROSIS SCREENING  2018, 2008  

 

 FOOT EXAM  2019, 2018,  



     2018, Additional  



     history exists  

 

 INFLUENZA VACCINE (#1)  2019, 2018,  



     2016, Additional  



     history exists  

 

 HEMOGLOBIN A1C  2020, 10/24/2019,  



     2019, Additional  



     history exists  

 

 DEPRESSION SCREENING  2020  

 

 FALL RISK ASSESSMENT  2020, 2019  

 

 LIPID DISORDER SCREENING  2020, 2019,  



     2019, Additional  



     history exists  

 

 MAMMOGRAM (SCREENING)  2020, 2017,  



     2015, Additional  



     history exists  

 

 Colonoscopy  10/06/2026  10/06/2016, 2009  

 

 PNEUMOCOCCAL 65+YRS  Completed  2019, 2016,  



     10/04/2006, Additional  



     history exists  

 

 HPV IMMUNIZATION SERIES  Aged Out    No longer eligible



       based on patient's age



       to complete this topic

 

 MENINGOCOCCAL VACCINE IMM  Aged Out    No longer eligible



       based on patient's age



       to complete this topic



documented as of this encounter



Goals







 Goal  Patient Goal  Associated  Recent  Patient-Stated?  Author



   Type  Problems  Progress    

 

 Blood Pressure  Blood  Hypertension  120/60  No  [place],



 < 140/90  Pressure    (2019    Colton BRASWELL,



       1:09 PM EST)    MD









 Note: 



Hypertension Care Plan



 



 Based on the patient's clinical history and according to JNC 8 guidelines 
target blood pressure goal is less than 140/90. Based on the patient's last 
blood pressure of BP: 140/70 mmHg the patient is at at goal.



 



 As your provider, it is important that I advise you regarding:



 



 

 your current medications and help you with any challenges you may face 
taking your medications as directed (ex. instructions, cost, side effects, and 
interactions).



 



 

 Important lifestyle changes: exercise, weight reduction, dietary sodium 
reduction and medication compliance



 



 

 your clinical goals and how you can achieve success: weight reduction, 
exercise plan and diet improvements



 



 

 medication management: N/A diet only



 



 

 patient education/self-management tools provided: Current self-management 
tools adequate



 



 To successfully manage my Hypertension I will:



 



 

 monitor my blood pressure daily, understanding that my goal is less than 140/
90 per my healthcare provider's recommendation. I will schedule an appointment 
with my provider if consistent abnormal readings greater than 160/100.



 



 

 take medications every day as prescribed by my healthcare provider and if 
unable to take them I will discuss with my provider.



 



 

 monitor for symptoms of chest pain, chest tightness/pressure, irregular 
heartbeat, persistent dizziness, radiating arm pain, and neck or jaw pain. If 
any of these symptoms are noticed I will seek medical attention immediately by 
calling 911



 



 

 exercise/walk 30 minutes 5 day(s) per week. If I experience chest pain, 
chest tightness, or shortness of breath, I will seek medical attention 
immediately.



 



 

 follow a diet rich in fruits, vegetables, and low-fat dairy products with 
reduced content of saturated & total fat. I will reduce my sodium intake daily. 
An example is the DASH diet. To obtain



 more information please refer to the DASH Eating Plan listed in Educational 
Resources.



 



 

 record my blood pressure results. Leo is safe and secure way for you to 
do this in your medical record online.



 



 

 try to obtain an ideal body weight. My recent weight was Weight: 197 lb (
89.359 kg). My weight loss goal for my next office visit is 185 .



 



 

 limit alcohol consumption. For men two drinks per day and women one drink 
per day.



 



 

 if currently smoking, will discuss how to quit smoking with my healthcare 
provider and work towards quitting.



 



 Educational Resources:



 



 National Heart, [employer], [employer] [place] http://nhlbi.nih.gov/hbp/index.html



 The DASH Diet Eating Plan     http://www.nhlbi.nih.gov/health/health-topics/
topics/dash/



 Academy of Nutrition & DIetetics  http://eatright.org



 National Smoking Cessation Site  http://smokefree.gov









 Blood Pressure <  Blood Pressure  Hypertension  120/60 (2019  Colton Juárez



 140/90      1:09 PM NOEMY BRASWELL MD









 Note: 



Hypertension Care Plan



 



 Based on the patient's clinical history and according to JNC 8 guidelines 
target blood pressure goal is less than 140/90. Based on the patient's last 
blood pressure of BP: 122/70 mmHg the patient is at at goal.



 



 As your provider, it is important that I advise you regarding:



 



 

 your current medications and help you with any challenges you may face 
taking your medications as directed (ex. instructions, cost, side effects, and 
interactions).



 



 

 Important lifestyle changes: exercise, weight reduction, diet and dietary 
sodium reduction



 



 

 your clinical goals and how you can achieve success: weight reduction and 
exercise plan



 



 

 medication management: N/A diet only



 



 

 patient education/self-management tools provided: Current self-management 
tools adequate



 



 To successfully manage my Hypertension I will:



 



 

 monitor my blood pressure daily, understanding that my goal is less than 140/
90 per my healthcare provider's recommendation. I will schedule an appointment 
with my provider if consistent abnormal readings greater than 160/100.



 



 

 take medications every day as prescribed by my healthcare provider and if 
unable to take them I will discuss with my provider.



 



 

 monitor for symptoms of chest pain, chest tightness/pressure, irregular 
heartbeat, persistent dizziness, radiating arm pain, and neck or jaw pain. If 
any of these symptoms are noticed I will seek medical attention immediately by 
calling 911



 



 

 exercise/walk 30 minutes 6 day(s) per week. If I experience chest pain, 
chest tightness, or shortness of breath, I will seek medical attention 
immediately.



 



 

 follow a diet rich in fruits, vegetables, and low-fat dairy products with 
reduced content of saturated & total fat. I will reduce my sodium intake daily. 
An example is the DASH diet. To obtain



 more information please refer to the DASH Eating Plan listed in Educational 
Resources.



 



 

 record my blood pressure results. [facility] is safe and secure way for you to 
do this in your medical record online.



 



 

 try to obtain an ideal body weight. My recent weight was Weight: 190 lb (
86.183 kg). My weight loss goal for my next office visit is 180 .



 



 

 limit alcohol consumption. For men two drinks per day and women one drink 
per day.



 



 

 if currently smoking, will discuss how to quit smoking with my healthcare 
provider and work towards quitting.



 



 Educational Resources:



 



 National Heart, Lung, & Blood [place] http://nhlbi.nih.gov/hbp/index.html



 The DASH Diet Eating Plan     http://www.nhlbi.nih.gov/health/health-topics/
topics/dash/



 Academy of Nutrition & DIetetics  http://eatright.org



 National Smoking Cessation Site  http://smokefree.gov









 Blood Pressure <  Blood Pressure    120/60 (2019  1:09  Colton Juárez,



 140/90      PM EST)    MD









 Note: 



This is an individualized treatment (blood pressure) goal for Misty Hylton:



 Displayed above (on the left) is your goal for blood pressure control.  Your 
most recent blood pressure is also shown above, on the right.



 You should try to achieve blood pressures that are lower than your goal listed 
above (on the left).









 Weight increase vs. 18 mo  CHF    32 (2019  1:09 PM EST)  Colton Juárez MD



 min (lbs) < 5          









 Note: 



This is an individualized treatment (congestive heart failure, CHF) goal for 
Misty Hylton:



 Displayed above (on the right) is how many pounds you are in excess of your 
lowest weight over the past 18 months.  Note that lower numbers are better.  
Excessive weight gain often indicates fluid reten



 tion and worsening heart failure.  You should contact your doctor immediately 
if the above number is too high (above your goal, the number on the left).









 Depression screen (PHQ-9) total score < 5  Depression      Colton Juárez MD









 Note: 



This is an individualized treatment (depression) goal for Misty Hylton:



 Displayed above is your goal for a depression screening (PHQ-9) score that 
would indicate good control of your depression.









 Diabetes < 7.0  Diabetes  Uncontrolled type 2  9.3 (2019  Colton Juárez



     diabetes mellitus with  9:09 AM EST)    MD MICHAELLE



     insulin therapy      









 Note: 



Formatting of this note might be different from the original.



 Diabetes Care Plan



 



 According to current 2014 ADA guidelines the patient A1C goal is less than 7. 
The patient's last A1C was



 Lab Results



 Lab Results Value Date/Time



  GLYCO 7.7 2015 1200



  GLYCO 8.2 2011 1020



 



 The patient is:above goal .



 



 As your provider, it is important that I advise you regarding:



 



 

 your current medications and help you with any challenges you may face 
taking your medications as directed (ex. instructions, cost, side effects, and 
interactions).



 



 

 Important lifestyle changes:exercise, diet, glucose monitoring and 
medication compliance



 



 

 your clinical goals and how you can achieve success:weight reduction, 
exercise plan, diet management and glucose monitoring



 



 

 medication management: adjusted medications as appropriate



 



 

 patient education/self-management tools provided: Current self-management 
tools adequate



 



 To successfully manage my Diabetes I will:



 



 

 have lab work every six months if my previous A1c was 7 or less. If my 
results were greater than 7, I will have lab work every three months. My goal 
is to control my diabetes by keeping A1c below 7.0



 



 

 take medications every day as prescribed by my healthcare provider and if 
unable to take them I will discuss with my provider.



 



 

 exercise/walk 30 minutes 6 day(s) per week. If I experience chest pain, 
chest tightness, or shortness of breath, I will seek medical attention 
immediately.



 



 

 check feet daily. If sores or irritation are noticed, will seek medical 
attention.



 



 

 follow a low carbohydrate and low fat diet. My goal is an LDL (bad 
cholesterol) number less than 100 when I have my routine lab work.



 



 

 check blood sugar as instructed and will call my healthcare provider if the 
results are consistently below 70 or above 300. I will monitor for symptoms of 
low blood sugar (feeling faint, dizzy, lig



 htheaded, jittery, sweaty, or hungry), if symptoms are noticed, I will eat or 
drink something (glucose tabs, orange juice, candy) to help raise sugar.



 



 

 record my blood sugar results (including dextrose sticks). [facility] is safe 
and secure way for you to do this in your medical record online.



 



 

 try to obtain an ideal body weight. My recent weight was Weight: 190 lb (
86.183 kg). My weight loss goal for my next office visit is 180 pounds .



 



 

 to prevent kidney problems common to people with diabetes I will complete a 
yearly Microalbumin to check for protein in urine. I will talk with my 
healthcare provider about medications to prevent diabetic renal disease.



 



 

 to prevent diabetic retinopathy I will see an eye doctor yearly. A yearly 
dilated eye exam helps prevent blindness.



 



 

 if currently smoking, will discuss how to quit smoking with my healthcare 
provider and work towards quitting.









 Glycohemoglobin A1c < 7.0  Diabetes    9.3 (2019  9:09 AM  Colton Juárez EST) MD









 Note: 



This is an individualized treatment (diabetes control, HgbA1C) goal for Misty Hylton:



 Displayed above is your progress towards your HgbA1C goal.  Your goal is shown 
above (on the left); your most recent HgbA1C is shown on the right.  Note that 
lower numbers are better.









 Keep immunizations current  Lifestyle      Colton Juárez MD









 Note: 



This is an individualized lifestyle goal for Misty Hylton:



 Please be sure to keep up-to-date on recommended immunizations.  For example, 
this would include a yearly influenza vaccine.



 Immunization status can be seen by looking at the Health Maintenance sections 
of your [facility], Plan of Care, and any After Visit Summaries.









 Consume a no-added-salt diet  Lifestyle      Colton Juárez MD









 Note: 



This is an individualized lifestyle goal for Misty Hylton:



 Please do not add additional salt to your food.  Additional salt may lead to 
fluid retention and worsen your congestive heart failure.









 Keep a regular sleep schedule  Lifestyle      Colton Juárez MD









 Note: 



This is an individualized lifestyle goal for Misty Hylton:



 Please maintain a regular sleep schedule.  This may help with some symptoms of 
depression.









 Take all prescribed medications as  Self-management      Colton Juárez MD



 directed          









 Note: 



This is an individualized self-management goal for Misty Hylton:



 Please take all prescribed medications as directed.



 1.  Do not skip doses.  If you cannot afford your medications, talk with your 
doctor.



 2.  Use a pill reminder system such as a pill box if needed.  Your pharmacist 
can help you with this.



 3.  Contact your Pharmacy 5 days before your medication runs out.  If you 
cannot take your medications for any reasons, talk with your doctor.



 4.  Please bring all of your medication bottles and inhalers (or a list of all 
your medications/inhalers) with you to every visit.



 Potential barriers to meeting all of your care plan goals will continue to be 
addressed on an ongoing basis.









 Check your weight daily  Self-management      Colton Juárez MD









 Note: 



This is an individualized self-management goal for Misty Hylton:



 Please check your weight daily.  Refer to the accompanying CHF treatment goal 
and call your doctor immediately for further instructions on how to respond to 
unexpected weight gain.



documented as of this encounter



Results

Not on filedocumented in this encounter



Visit Diagnoses







 Diagnosis

 

 Viral gastroenteritis







 Intestinal infection due to other organism, not elsewhere classified

 

 Moderate dehydration







 Dehydration

 

 LLQ abdominal pain







 Abdominal pain, left lower quadrant

 

 Diabetic hypoglycemia (HCC)







 Type II or unspecified type diabetes mellitus with other specified 
manifestations,



 not stated as uncontrolled

 

 CKD stage 3 due to type 2 diabetes mellitus (HCC)

 

 Essential hypertension







 Unspecified essential hypertension

 

 Early onset Alzheimer's dementia without behavioral disturbance (HCC)

 

 Panlobular emphysema (HCC)







 Other emphysema

 

 Type 2 diabetes mellitus with diabetic neuropathy, with long-term current use 
of



 insulin (HCC)



documented in this encounter



Insurance







 Payer  Benefit Plan / Group  Subscriber ID  Effective Dates  Phone  Address  
Type

 

 MEDICARE  MEDICARE PART A & B  xxxxxxxxxxx  10/1/2013-Present      Medicare









 Guarantor Name  Account Type  Relation to  Date of Birth  Phone  Billing



     Patient      Address

 

 John Hyltondanis DENNIS  Personal/Family    1950  [phone]  [address] [place] [address]







         (Work)  [place], [place]



           [id number]



documented as of this encounter



Advance Directives







 Type  Date Recorded  Patient Representative  Explanation

 

 Advance Directives  2018 11:33 AM    [facility]



       change form

 

 Advance Directives  10/10/2019  8:55 AM    HEALTH CARE PROXY

## 2020-01-24 NOTE — XMS REPORT
Continuity of Care Document (CCD)

 Created on:December 10, 2019



Patient:Sabrina Walters

Sex:Female

:1950

External Reference #:[id number]





Demographics







 Address  [address] [place] [address]

 

 Home Phone  [phone]

 

 Preferred Language  en

 

 Marital Status  Not  or 

 

 Amish Affiliation  Unknown

 

 Race  White

 

 Ethnic Group  Not  or 









Author







 Name  Isaiah Muse MD

 

 Address  [address]



   [place] [address]









Care Team Providers







 Name  Role  Phone

 

 Colton Norris MD - Internal  Care Team Information   [phone]
[phone]



 Medicine    









Problems







 Active Problems  Provider  Date

 

 Transient cerebral ischemia    Onset: 







Social History







 Type  Date  Description  Comments

 

 Birth Sex    Unknown  

 

 Tobacco Use  Start: Unknown  Never Smoked Cigarettes  

 

 Smoking Status  Reviewed: 19  Never Smoked Cigarettes  

 

 ETOH Use    Has consumed alcohol in  



     the past  

 

 Tobacco Use  Start: Unknown  Patient denies history  



     of smoking  

 

 Recreational Drug Use    Denies Drug Use  

 

 Exercise Type/Frequency    Exercises sporadically  walks with warm



       weather







Allergies, Adverse Reactions, Alerts







 Active Allergies  Reaction  Severity  Comments  Date

 

 Cephalosporins      Hives  2014

 

 Carbamazepine      rash  2014

 

 Clavulanic Acid        2014

 

 Amoxicillin      rash  2014

 

 Metformin      rash  2014

 

 Penicillin        10/10/2018







Medications







 Active Medications  SIG  Qnty  Indications  Ordering  Date



         Provider  

 

 Alber Benavidez  1 dose inhaled  60units  J44.9  Micha,  2019



   daily Wash mouth      MD Isaiah  



 100-25mcg/Inh Aerosol  and gargle after        



   each dose        

 

 Plavix  1 by mouth every  30tabs    Unknown  2014



      75mg Tablets  day        



           

 

 Toujeector Breaux Solostar        Jr,  



         MD Colton  



 300Unit/ML Solution          



 Pen-Inject          



           

 

 Glucagon Emergency  sq or      Unknown  



                  1mg  intramuscular, may        



 Kit  repeat in 15 mins        



   if not adequate        



   response        

 

 Cepacol Sore Throat &  1 by mouth every 4      Unknown  



 Cough  hours as needed        



     5-7.5mg Lozenges          



           

 

 Benadryl Allergy  1 tab by mouth      Unknown  



                25mg  every 6 hours as        



 Capsules  needed        



           

 

 Norco  1 by mouth every 4  15tabs    Unknown  



     5-325mg Tablets  hours as needed        



           

 

 Nystatin        Tyler,  



        [id number]        CALLIE Mcdonnell MD  



 Cream          



           

 

 Spiriva Handihaler        [place],  



         MD Colton  



 18mcg Capsules          



           

 

 Novofine Autocover  Use as Directed      Unknown  



                  30G  Three Times A Day        



 X 8 mm Misc          



           

 

 Tessalon Perles  1 tomas by mouth      Unknown  



               100mg  three times a day        



 Capsules  as needed cough        



           

 

 Calcium Carbonate  1 by mouth twice a      Unknown  



                 600mg  day        



 Tablets          



           

 

 Vitamin E  1 by mouth every      Unknown  



         400Unit  day        



 Capsules          



           

 

 Ondansetron HCL  1 by mouth every 4  14tabs    Jr,  



               4mg  hours as needed      MD Colton  



 Tablets          



           

 

 Multi Complete  1 by mouth every      Unknown  



   day        



 Capsules          



           

 

 Lisinopril  Take One Tablet By      Unknown  



          5mg Tablets  Mouth Every Day        



           

 

 Levothyroxine Sodium  Take One Tablet By      Unknown  



   Mouth Every Morning        



 50mcg Tablets  Before Breakfast        



           

 

 Lactulose  30 milliliters by      Unknown  



         20GM/30ML  mouth every day as        



 Solution  needed for        



   constipation        

 

 Ibuprofen  Take One Tablet      Unknown  



         600mg Tablets  Three Times A Day        



   as Needed For Pain        

 

 Humalog Kwikpen  inject 14 units      Unknown  



   under the skin        



 100Unit/ML Solution  before meals        



 Pen-Inject          



           

 

 Gabapentin  1 by mouth twice a      Unknown  



          600mg  day        



 Capsules          



           

 

 Atorvastatin Calcium  Take One Tablet By      Unknown  



   Mouth Once Daily        



 40mg Tablets          



           

 

 Spironolactone  Take One Tablet By      Unknown  



              25mg  Mouth Every Day        



 Tablets          



           

 

 Albuterol Sulfate  Inhale The Contents      Unknown  



   Of One Vial Via        



 (2.5mg/3ML) 0.083%  Nebulizer Every 4        



 Nebulizer  To 6 Hours as NE        



           

 

 Ventolin HFA  Every 4 To 6 HRS      Unknown  



            108mcg/Act  prn Shortness Of        



 Aerosol  Breath        



           

 

 Sertraline HCL  Once Daily      Unknown  



              50mg          



 Tablets          



           

 

 Docusate Sodium  2 Times A Day      Unknown  



               100mg          



 Capsules          



           

 

 Spiriva Handihaler  Once Daily      Unknown  



           



 18mcg Capsules          



           

 

 Torsemide  1 by mouth every  30tabs    Unknown  



         20mg Tablets  day        



           

 

 Levemir Flexpen  inject 48 units      Unknown  



   subq twice a day        



 100Unit/ML Solution          



 Pen-Inject          



           







Immunizations







 CPT Code  Status  Date  Vaccine  Lot #

 

 [id number]  Given  Unknown  Influenza Virus Split Children 6-35 Mo Of Age  



       Intramuscular Use  







Vital Signs







 Date  Vital  Result  Comment

 

 2019  1:00pm  BP Systolic Sitting Right Arm  157 mmHg  









 BP Diastolic Sitting Right Arm  61 mmHg  

 

 Heart Rate  60 /min  

 

 Respiratory Rate  18 /min  

 

 Weight  185.00 lb  

 

 O2 % BldC Oximetry  98 %  ora









 2019  3:02pm  BP Systolic Sitting Left Arm  86 mmHg  









 BP Diastolic Sitting Left Arm  43 mmHg  

 

 Heart Rate  63 /min  

 

 Respiratory Rate  16 /min  

 

 Height  66 inches  5'6" per pt

 

 Weight  177.00 lb  

 

 BMI (Body Mass Index)  28.6 kg/m2  

 

 BSA (Body Surface Area)  1.90 m2  

 

 Ideal body weight in kilograms  59 kg  

 

 O2 % BldC Oximetry  97 %  ra







Results







 Description

 

 No Information Available







Procedures







 Date  Code  Description  Status

 

 2019  28423  Bronchospasm Provocation Evaluation Multi Spirometric  
Completed



     Determinati  

 

 2019  82151  Bronchospasm Provocation Evaluation Multi Spirometric  
Completed



     Determinati  

 

 2019  41740  Spirometry  Completed

 

 2019  29765  EKG Interpretation And Report Only  Completed







[employer] Devices







 Description

 

 No Information Available







Encounters







 Type  Date  Location  Provider  Dx  Diagnosis

 

 Office Visit  2019  Pulmonology  [email or web address]  Chronic 
obstructive



   1:00p    MD Isaiah    pulmonary disease,



           unspecified

 

 Office Visit  2019  Pulmonology  [email or web address]  Chronic 
obstructive



   3:00p    MD Isaiah    pulmonary disease,



           unspecified









 R91.1  Solitary pulmonary nodule







Assessments







 Date  Code  Description  Provider

 

 2019  J44.9  Chronic obstructive pulmonary disease,  Isaiah Muse MD



     unspecified  

 

 2019  J44.9  Chronic obstructive pulmonary disease,  Isaiah Muse MD



     unspecified  

 

 2019  J44.9  Chronic obstructive pulmonary disease,  Isaiah Muse MD



     unspecified  

 

 2019  J44.9  Asthma-chronic obstructive pulmonary  Isaiah Muse MD



     disease overlap syndrome  

 

 2019  R91.1  Solitary nodule of lung  Isaiah Muse MD

 

 2019  J18.1  Lobar pneumonia, unspecified organism  Milan Song M.D.

 

 2019  J44.1  Chronic obstructive pulmonary disease  Milan Song M.D.



     with (acute) exacerbation  

 

 2019  Z99.81  Dependence on supplemental oxygen  Milan Song M.D.

 

 2019  J96.11  Chronic respiratory failure with  Milan Song M.D.



     hypoxia  

 

 2019  J18.1  Lobar pneumonia, unspecified organism  Milan Song M.D.

 

 2019  J44.1  Chronic obstructive pulmonary disease  Milan Song M.D.



     with (acute) exacerbation  

 

 2019  J80  Acute respiratory distress syndrome  Milan Song M.D.

 

 2019  Z99.81  Dependence on supplemental oxygen  Milan Song M.D.

 

 2019  J18.1  Lobar pneumonia, unspecified organism  Milan Song M.D.

 

 2019  R94.31  Abnormal electrocardiogram [ECG] [EKG]  Juan Carlos Yanez M.D.,



       [facility]

 

 2019  J44.1  Chronic obstructive pulmonary disease  Milan Song M.D.



     with (acute) exacerbation  

 

 2019  R00.1  Bradycardia, unspecified  Juan Carlos Yanez M.D.,



       [facility]

 

 2019  J80  Acute respiratory distress syndrome  Milan Song M.D.

 

 2019  I10  Essential (primary) hypertension  Juan Carlos Yanez M.D.,



       [facility]

 

 2019  Z99.81  Dependence on supplemental oxygen  Milan Song M.D.

 

 2019  E11.65  Type 2 diabetes mellitus with  Milan Song M.D.



     hyperglycemia  

 

 2019  R07.9  Chest pain, unspecified  Milan Song M.D.

 

 2019  R94.31  Abnormal electrocardiogram [ECG] [EKG]  Milna Song M.D.

 

 2019  R06.02  Shortness of breath  Milan Song M.D.

 

 2019  E11.65  Type 2 diabetes mellitus with  Milan Song M.D.



     hyperglycemia  

 

 2019  R07.9  Chest pain, unspecified  Milan Song M.D.

 

 2019  R94.31  Abnormal electrocardiogram [ECG] [EKG]  Milan Song M.D.

 

 2019  R06.02  Shortness of breath  Milan Song M.D.

 

 2019  E11.65  Type 2 diabetes mellitus with  Milan Song M.D.



     hyperglycemia  

 

 2019  R07.9  Chest pain, unspecified  Milan Song M.D.

 

 2019  R94.31  Abnormal electrocardiogram [ECG] [EKG]  Milan Song M.D.

 

 2019  M79.672  Pain in left foot  Milan Song M.D.

 

 2019  E11.65  Type 2 diabetes mellitus with  Milan Song M.D.



     hyperglycemia  

 

 2019  R07.9  Chest pain, unspecified  Milan Song M.D.

 

 2019  R94.31  Abnormal electrocardiogram [ECG] [EKG]  Milan Song M.D.

 

 2019  R06.02  Shortness of breath  Milan Song M.D.

 

 2019  E11.65  Type 2 diabetes mellitus with  Concha Mancera M.D.



     hyperglycemia  

 

 2019  R07.9  Chest pain, unspecified  Concha Mancera M.D.

 

 2019  R94.31  Abnormal electrocardiogram [ECG] [EKG]  Concha Mancera M.D.

 

 2019  R06.02  Shortness of breath  Concha Mancera M.D.







Plan of Treatment

2019 - Isaiah Muse MDJ44.9 Chronic obstructive pulmonary 
disease, unspecifiedAllComments:Continue current inhalerReviewed results of 
testing with patient and daughterno supplemental oxygen required reinforced 
instructions on how to perform inhalatory maneuvers Reports vaccination up to 
dateFollow up:6 months



Functional Status







 Functional Condition  Comment  Date  Status

 

 Independent with all ADL's      Active

 

 Manual wheelchair is used to ambulate      Active







Mental Status







 Description

 

 No Information Available







Referrals







 Description

 

 No Information Available

## 2020-01-24 NOTE — XMS REPORT
Summary of Care

 Created on:2020



Patient:Sabrina Hylton

Sex:Female

:1950

External Reference #:[id number]





Demographics







 Address  [address] [place] [address]

 

 Mobile Phone  [phone]

 

 Preferred Language  English

 

 Marital Status  Not  or 

 

 Gnosticism Affiliation  Unknown

 

 Race  White

 

 Ethnic Group  Not  or 









Author







 Organization  [facility]

 

 Address  [address] [place] [address]









Support







 Name  Relationship  Address  Phone

 

 Rebeca Herrera  Unavailable  Unavailable  [phone]

 

 Barbie Britt  Unavailable  Unavailable  [phone]









Care Team Providers







 Name  Role  Phone

 

 Colton Norris  Primary Care Provider  [phone]

 

 Gloria Cardenas RN  Unavailable  [phone]

 

 William Alamo MD  Unavailable  [phone]









Reason for Visit







 Reason  Comments

 

 Transitional Care Management  Patient discharged from [facility] on 



   for high blood sugar.

 

 Insomnia  Patient lost her sister on 2020 to a fire and it



   has been very hard on patient and she is not sleeping.







Encounter Details







 Date  Type  Department  Care Team  Description

 

 2020  Office Visit  [facility]  Colton Norris,  Grief reaction (
Primary Dx);



     Medicine



  MD



  Situational anxiety;



     [address]



  [address]



  Uncontrolled type 2 diabetes mellitus with peripheral neuropathy (HCC);



     [place] [address]



  [place] [address]



  Panlobular emphysema (HCC);



     [phone]



  Hyponatremia;



       [phone] (Fax)  Ataxia;



         History of falling







Allergies







 Active Allergy  Reactions  Severity  Noted Date  Comments

 

 Amoxicillin-Pot Clavulanate  Rash    2014  

 

 Cephalosporins  Hives    2008  

 

 Metformin  Rash    2014  

 

 Carbamazepine  Rash    2007  



documented as of this encounter (statuses as of 2020)



Medications







 Medication  Sig  Dispensed  Refills  Start  End Date  Status



         Date    

 

 Lancets Does not  1 Each by Does  100 Each  5      Active



 apply  not apply route      6    



 MiscIndications:  THREE TIMES          



 Type 2 diabetes  DAILY. Brand:          



 mellitus without  any E11.9.          



 complication  Insulin          



 (HCC), Type 2  dependent          



 diabetes mellitus            



 with hyperglycemia            



 (HCC)            

 

 Insulin  1 Device by Does  100 Each  5      Active



 Syringe-Needle  not apply route      7    



 U-100 (CAREONE  AS DIRECTED.          



 INSULIN SYRINGE)            



 30G X 1/2" 1 ML            



 Does not apply            



 Misc            

 

 Insulin  Inject 1 Each beneath the skin TWICE DAILY. Diabetes Mellitus  200 
Each  3      Active



 Syringe-Needle  Injects 2 times per day.      7    



 U-100 31G X 3/8" 1            



 ML Does not apply            



 Misc            

 

 Blood Glucose  1 Device by Does  1 Device  0      Active



 Monitor Software  not apply route      8    



 Does not apply  AS DIRECTED.          



 Device  uncontrolled          



   insulin          



   dependent          



   diabetes.          



   Brand: Insurance          



   preferred          

 

 Alcohol Swabs  1 Each by Does  300 Each  5      Active



 (ALCOHOL PADS) 70  not apply route      8    



 % Does not apply  AS NEEDED          



 Pads  (diabetes          



   testing &          



   Insulin adm).          

 

 spironolactone  TAKE ONE TABLET  30 Tab      Active



 (ALDACTONE) 25 MG  BY MOUTH EVERY      9    



 Oral Tab  DAY          

 

 atorvastatin  Take 1 Tab by  90 Tab  3      Active



 (LIPITOR) 40 MG  mouth DAILY.      9    



 Oral Tab            

 

 levothyroxine  Take 1 Tab by  30 Tab  11      Active



 (SYNTHROID) 50 MCG  mouth BEFORE      9    



 Oral Tab  BREAKFAST.          

 

 Insulin Pen Needle  Inject 1 Appl  90 Each      Active



 (NOVOFINE) 30G X 8  beneath the skin      9    



 MM Does not apply  TWICE DAILY.          



 Misc            

 

 valsartan (DIOVAN)  Take 1 Tab by  30 Tab  5  20  Active



 160 MG Oral Tab  mouth DAILY.      9  20  

 

 bisoprolol  Take 1 Tab by  30 Tab  5      Active



 (ZEBETA) 5 MG Oral  mouth DAILY.      9    



 Tab            

 

 torsemide  TAKE TWO TABLETS  180 Tab  3      Active



 (DEMADEX) 20 MG  BY MOUTH ONCE      9    



 Oral Tab  DAILY          

 

 docusate sodium  TAKE ONE CAPSULE  60 Cap  4      Active



 (COLACE) 100 MG  BY MOUTH TWICE A      9    



 Oral  DAY          



 CapIndications:            



 Constipation            

 

 Incontinence  1 Each by Does  250 Each      Active



 Supply Disposable  not apply route      9    



 Does not apply  Continuous prn          



 MiscIndications:  (incontinence).          



 Incontinence in  Adult briefs for          



 female  Urinary          



   incontinence.          



   Size Extra          



   large. MDD 6          

 

 albuterol-ipratrop  3 mL by  90 vial  11      Active



 ium (DUO-NEB)  Inhalation-SVN      9    



 0.5-2.5 (3) MG/3ML  route EVERY FOUR          



 Inhalation   HOURS AS NEEDED          



 Solution  (shortness of          



   breath).          

 

 Glucose Blood  1 Strip by Does  200 Strip  5      Active



 (FREESTYLE LITE)  not apply route      9    



 In Vitro  FOUR TIMES          



 StripIndications:  DAILY. Freestyle          



 Type 2 diabetes  Lite, E11.9,          



 mellitus with  insulin depend          



 hyperglycemia,            



 unspecified            



 whether long term            



 insulin use ([facility])            

 

 Nystatin 887784  APPLY THREE  60 g  3      Active



 UNIT/GM Apply  TIMES A DAY AS      9    



 externally Powder  NEEDED FOR LEG          



   RASH          

 

 Insulin Aspart  Inject 5-10  15 mL  5      Active



 (NOVOLOG FLEXPEN)  Units beneath      9    



 100 UNIT/ML  the skin THREE          



 Subcutaneous  TIMES DAILY.          



 Solution            



 Pen-injectorIndica            



 tions: Type 2            



 diabetes mellitus            



 with diabetic            



 neuropathy, with            



 long-term current            



 use of insulin            



 ([facility])            

 

 Insulin Glargine,  Inject 40 Units  15 mL  4      Active



 1 Unit Dial,  beneath the skin      9    



 (TOUJEO SOLOSTAR)  TWICE DAILY.          



 300 UNIT/ML            



 Subcutaneous            



 Solution            



 Pen-injector            

 

 gabapentin  Take 1 Cap by  90 Cap  4      Active



 (NEURONTIN) 400 MG  mouth EVERY      9    



 Oral Cap  BEDTIME.          

 

 fluoxetine  Take 1 Cap by  30 Cap  5  20  Active



 (PROZAC) 20 MG  mouth DAILY.      0  21  



 Oral Cap            

 

 trazodone  Take 1 Tab by  30 Tab  1      Active



 (DESYREL) 50 MG  mouth EVERY      0    



 Oral Tab  BEDTIME.          

 

 sertraline  TAKE ONE TABLET  30 Tab  5  20  Discontinued



 (ZOLOFT) 50 MG  BY MOUTH EVERY      9  20  (Provider



 Oral Tab  DAY          Discontinued)



documented as of this encounter (statuses as of 2020)



Active Problems







 Problem  Noted Date

 

 Other specified hypothyroidism  2018

 

 CKD stage 3 due to type 2 diabetes mellitus  2018

 

 Essential hypertension  2017

 

 Early onset Alzheimer's dementia without behavioral disturbance  2017









 Overview: 







 Night time wandering and hallucinations









 Wandering associated with mental disorder  2017

 

 IPMN (intraductal papillary mucinous neoplasm) - multifocal branch duct  2017









 Overview: 



Formatting of this note might be different from the original.



 Initial Presentation: Patient w/ known history of hepatic cirrhosis and 
ongoing abdominal pain. Numerous pancreatic cystic lesions noted on 
surveillance imaging



 Referring Provider: Dr. Nikolas Plascencia



 Primary Care Provider: Colton Norris



 



 Working Diagnosis: Multifocal branch duct IPMN



 Initial Evaluation:



 2015 CT scan (abd/pelvis) - [facility]



  Hepatic cirrhosis



  Splenomegaly



  Multiple pancreatic cystic lesions w/ largest measuring 2.2 cm



 



 2016 CT scan (abd/pelvis) - [facility]



  Hepatic cirrhosis



  Multiple pancreatic cystic lesions w/ progression from previous imaging



 



 2016 CT scan (ad/pelvis) - [facility]



  Hepatic cirrhosis



  Spleen normal in size



  Pancreatic calcifications



  Multiple cystic lesions pancreatic body increasing from previous imaging



 



 2016 MRI -



  Hepatic cirrhosis



  No ductal dilation



  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



   Largest located jct body/tail measuring 3.8 x 3.1 cm



    Multiloculated



    Enhancing septae



    No nodular soft tissue component



   Possible branch-duct IPMN



 



 2016 EUS -



  Multiple cystic lesion throughout pancreas



  No pancreatic duct dilation, no mass/lesions, no parenchymal abnormality



  Largest measures 2.5 x 2.2 cm w/o associated mass



   FNA:



    Cytology: no malignancy



    Mucin stain: not done due to acellular sample



    Amylase: 44



    CEA: 1,064



 



 Lab Results



 Component Value Date



  Ca19-9 40 (H) 11/15/2016



 



 Lab Results



 Component Value Date



  Alpha Fetoprotein Tumor Marker 5.0 11/15/2016



 



 Surgical Management:



 Patient poor surgical candidate



  Cirrhosis - Sherman Machado A



  Poorly controlled diabetic w/ A1c of 10 % (2018)



  Requires greater than 80 hours of home care per week



  Poor short and long-term memory issues



 



 Follow -up evaluation:



 Management of Asymptomatic Neoplastic Pancreatic Cysts, revised 



 Cystic lesion measures < 3 cm w/o worrisome features - repeat MRI 1 year



  (main pancreatic duct dilation or solid component to the cyst)



 If remains stable, increase surveillance every 2 years.



 If no change over 5 year surveillance period, no further imaging indicted.



 



 If cystic lesion > 3 cm w/ worrisome features - EUS w/ cytology analysis



  (main pancreatic duct dilation or solid nodularity within the cyst).



 If concerning cytology and/or 2 worrisome features - surgical evaluation.



 



 3/23/2018 MRI -



  Again there is replacement of the pancreas with innumerable cystic lesions,



   largest and most prominent involving the body and tail region,



    measuring approximately 1.0 to 1.5 cm.



   There are no nodular enhancing soft tissue components.



  No dilatation of the pancreatic duct or peripancreatic lymphadenopathy.



  Stable moderate pancreatic parenchymal atrophy.



  Cystic lesion are most compatible w/ multifocal branch duct IPMN









 Uncontrolled type 2 diabetes mellitus with peripheral neuropathy  01/15/2016

 

 Chronic combined systolic and diastolic heart failure  2015

 

 Mixed urge and stress incontinence  2015









 Overview: 







 Diuretic use.









 BMI 31.0-31.9,adult  2011

 

 Mixed hyperlipidemia  2007

 

 COPD (chronic obstructive pulmonary disease)  









 Overview: 







 Restrictive physiology on PFTs : FEV1/FVC 89%, FEV1 62%, [id number] 44%; 
follow up



 PFTs in 2014 showed normal spirometry with slightly low DLCO.









 Hepatitis C  









 Overview: 







 genotype 1a, hepatitis grade 2-3, periportal fibrosis grade 2, Dr. Bauer









 Non-cardiac chest pain  









 Overview: 







 [place] [facility] [place]









 Uncontrolled type 2 diabetes mellitus with insulin therapy  

 

 Osteoarthritis  









 Overview: 







 hip and ankle









 Noturnal Hypoxemia  









 Overview: 







 home O2 started 11/10









 Aortic stenosis  









 Overview: 







 mild in May 2014, EF 65%









 Carotid stenosis  









 Overview: 







 >70% bilateral obstruction in 2014, vascular surgery 2014









 Cirrhosis  









 Overview: 



Formatting of this note might be different from the original.



 macronodular disease with ascites on CT abdomen , splenomegaly and portal 
congestion



 



 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



    2017



 INR:     1.17



 Total Bilirubin:   0.9



 Albumin:    3.9



 Ascites:   No



 Hepatic encephalitis:  ?



 



 



 Sherman- Machado Grade:



 A  5-6



 B  7-9



 C  10-15



 



 Results for SABRINA HYLTON (MRN [id number])



  Ref. Range 2015 14:21 11/15/2016 13:25



 Alpha Fetoprotein Tumor Marker Latest Ref Range: <6.1 NG/ML 8.5 (H) 5.0









 Recurrent major depressive disorder, in partial remission  









 Overview: 







 long-standing depression



documented as of this encounter (statuses as of 2020)



Resolved Problems







 Problem  Noted Date  Resolved Date

 

 Nonhealing ulcer of left lower leg  2019

 

 Diabetic hypoglycemia  2017

 

 Unspecified essential hypertension  2007

 

 Asthma with chronic obstructive pulmonary disease (COPD)  2007  10/09/
2014

 

 Unspecified disorder of kidney and ureter  2007  10/09/2014

 

 Trigeminal neuralgia  2007  10/09/2014

 

 Diabetes Mellitus Type 2  2007

 

 ASTHMA CHR OBSTR BRONCHITIS  2007  10/09/2014

 

 Obesity, unspecified  2007  10/09/2014

 

 Erythema nodosum  2007  10/09/2014

 

 Chronic hepatitis C without mention of hepatic coma  2006  10/09/2014

 

 Noturnal Hypoxemia    10/09/2014









 Overview: 







 home O2 started 11/10









 Hypertension    2017

 

 Dyslipidemia    2019

 

 Chronic sinusitis    2017









 Overview: 







 on CT 3/10/09



documented as of this encounter (statuses as of 2020)



Immunizations







 Name  Administration Dates  Next Due

 

 Adacel TdaP  10/04/2006  

 

 Hepatitis B Vaccine Adult  2009, 2009, 2009  

 

 Influenza (IM) Preservative Free  10/02/2009, 10/20/2008  

 

 Influenza Vaccine High Dose  2018, 2018, 2016,  



   2015  

 

 Influenza Vaccine Whole  10/04/2006, 2003  

 

 PNEUMOCOCCAL POLYSACCHARIDE VACCINE  2019, 10/04/2006, 2006  

 

 Pneumococcal Conjugate(13 Valent)  2016  

 

 TDAP Vaccine  10/20/2008  

 

 Tetanus Vaccine  2001  



documented as of this encounter



Social History







 Tobacco Use  Types  Packs/Day  Years Used  Date

 

 Never Smoker        









 Smokeless Tobacco: Never Used      









 Alcohol Use  Drinks/Week  oz/Week  Comments

 

 No  0 Standard drinks or equivalent  0.0  recovering alcoholic









 Sex Assigned at Birth  Date Recorded

 

 Not on file  









 Job Start Date  Occupation  Industry

 

 Not on file  Not on file  Not on file









 Travel History  Travel Start  Travel End









 No recent travel history available.



documented as of this encounter



Last Filed Vital Signs







 Vital Sign  Reading  Time Taken  Comments

 

 Blood Pressure  132/74  2020  1:11 PM EST  

 

 Pulse  78  2020  1:11 PM EST  

 

 Temperature  -  -  

 

 Respiratory Rate  -  -  

 

 Oxygen Saturation  -  -  

 

 Inhaled Oxygen Concentration  -  -  

 

 Weight  89.8 kg (198 lb)  2020  1:11 PM EST  

 

 Height  162.6 cm (5' 4")  2020  1:11 PM EST  

 

 Body Mass Index  33.99  2020  1:11 PM EST  



documented in this encounter



Patient Instructions

Patient InstructionsColton Norris MD - 2020  1:00 PM ESTContinue the 
insulin at current doses

Stop sertraline use fluoxitene once daily for depression

Trazodone 1 pill at bedtime for sleep and depression

Finger stick three times daily

Bring in the log book with sugars on it next office visit

Electronically signed by Colton Norris MD at 2020  1:27 PM EST

documented in this encounter



Progress Notes

Colton Norris MD - 2020  1:00 PM ESTFormatting of this note might be 
different from the original.

TCM  Statement.

Review of the  hospitalization:

I am seeing for transition of care following hospitalization.

The date of discharge was: [facility]

The discharge diagnosis was  Hyperglycemia hemoglobin A1C 9.8 and hyponatremia 
due to glucose and copd and depression

She was sent home on higher dose basal insulin now on basaglar 80 units twice 
daily  And sliding scale admelog premeal she and daughter forgot to bring in 
diabetes mellitus log book and machine



She had death of sister in a trailer fire and is very upset tearful crying and 
depressed cannot sleep and is tired all day

She is on sertraline 50 mg daily



Patient Active Problem List

Diagnosis

 Mixed hyperlipidemia

 BMI 31.0-31.9,adult

 COPD (chronic obstructive pulmonary disease) (HCC)

 Hepatitis C

 Non-cardiac chest pain

 Uncontrolled type 2 diabetes mellitus with insulin therapy (HCC)

 Osteoarthritis

 Noturnal Hypoxemia

 Aortic stenosis

 Carotid stenosis

 Cirrhosis (HCC)

 Recurrent major depressive disorder, in partial remission (HCC)

 Mixed urge and stress incontinence

 Chronic combined systolic and diastolic heart failure (HCC)

 Uncontrolled type 2 diabetes mellitus with peripheral neuropathy (HCC)

 IPMN (intraductal papillary mucinous neoplasm) - multifocal branch duct

 Early onset Alzheimer's dementia without behavioral disturbance (HCC)

 Wandering associated with mental disorder

 Essential hypertension

 CKD stage 3 due to type 2 diabetes mellitus (HCC)

 Other specified hypothyroidism



Outpatient Medications Marked as Taking for the 20 encounter (Office Visit) 
with Ghislaine Norris MD

Medication Sig Dispense Refill

 albuterol-ipratropium (DUO-NEB) 0.5-2.5 (3) MG/3ML Inhalation Solution 3 
mL by Inhalation-SVNroute EVERY FOUR  HOURS AS NEEDED (shortness of breath). 90 
vial 11

 Alcohol Swabs (ALCOHOL PADS) 70 % Does not apply Pads 1 Each by Does not 
apply route AS NEEDED (diabetes testing &amp; Insulin adm). 300 Each 5

 atorvastatin (LIPITOR) 40 MG Oral Tab Take 1 Tab by mouth DAILY. 90 Tab 3

 bisoprolol (ZEBETA) 5 MG Oral Tab Take 1 Tab by mouth DAILY. 30 Tab 5

 Blood Glucose Monitor Software Does not apply Device 1 Device by Does 
not apply route AS DIRECTED. uncontrolled insulin dependent diabetes.  Brand: 
Insurance preferred 1 Device 0

 docusate sodium (COLACE) 100 MG Oral Cap TAKE ONE CAPSULE BY MOUTH TWICE 
A DAY 60 Cap 4

 fluoxetine (PROZAC) 20 MG Oral Cap Take 1 Cap by mouth DAILY. 30 Cap 5

 gabapentin (NEURONTIN) 400 MG Oral Cap Take 1 Cap by mouth EVERY 
BEDTIME. 90 Cap 4

 Glucose Blood (FREESTYLE LITE) In Vitro Strip 1 Strip by Does not apply 
route FOUR TIMES DAILY. Freestyle Lite, E11.9, insulin depend 200 Strip 5

 Incontinence Supply Disposable Does not apply Misc 1 Each by Does not 
apply route Continuous prn (incontinence). Adult briefs for Urinary 
incontinence. Size Extra large. MDD 6 250 Each 6

 Insulin Aspart (NOVOLOG FLEXPEN) 100 UNIT/ML Subcutaneous Solution Pen-
injector Inject 5-10 Units beneath the skin THREE TIMES DAILY. 15 mL 5

 Insulin Glargine, 1 Unit Dial, (TOUJEO SOLOSTAR) 300 UNIT/ML 
Subcutaneous Solution Pen-injector Inject 40 Units beneath the skin TWICE 
DAILY. 15 mL 4

 Insulin Pen Needle (NOVOFINE) 30G X 8 MM Does not apply Misc Inject 1 
Appl beneath the skin TWICE DAILY. 90 Each 11

 Insulin Syringe-Needle U-100 (CAREONE INSULIN SYRINGE) 30G X 1/2" 1 ML 
Does not apply Misc 1 Device by Does not apply route AS DIRECTED. 100 Each 5

 Insulin Syringe-Needle U-100 31G X 3/8" 1 ML Does not apply Misc Inject 
1 Each beneath the skin TWICE DAILY. Diabetes Mellitus

Injects 2 times per day. 200 Each 3

 Lancets Does not apply Misc 1 Each by Does not apply route THREE TIMES 
DAILY. Brand: any E11.9. Insulin dependent 100 Each 5

 levothyroxine (SYNTHROID) 50 MCG Oral Tab Take 1 Tab by mouth BEFORE 
BREAKFAST. 30 Tab 11

 Nystatin 016419 UNIT/GM Apply externally Powder APPLY THREE TIMES A DAY 
AS NEEDED FOR LEG RASH 60 g 3

 spironolactone (ALDACTONE) 25 MG Oral Tab TAKE ONE TABLET BY MOUTH EVERY 
DAY 30 Tab 11

 torsemide (DEMADEX) 20 MG Oral Tab TAKE TWO TABLETS BY MOUTH ONCE DAILY 
180 Tab 3

 trazodone (DESYREL) 50 MG Oral Tab Take 1 Tab by mouth EVERY BEDTIME. 30 
Tab 1

 valsartan (DIOVAN) 160 MG Oral Tab Take 1 Tab by mouth DAILY. 30 Tab 5

 Exam

/74  Pulse 78  Ht 5' 4" (1.626 m)  Wt 198 lb (89.8 kg)  BMI 33.99 kg/m2

Tearful depressed no suicidal ideation

.no change in mental status she denies suicidal ideation

  ICD-9-CM ICD-10-CM

1. Grief reaction stop sertraline and start fluoxitene 20 mg consider 
counseling referral  309.0 F43.21

2. Situational anxiety trazodone at bedtime low dose  300.09 F41.8

3. Uncontrolled type 2 diabetes mellitus with peripheral neuropathy (HCC) 
continue current medications bring in glucose log book next office visit  
250.62 E11.42

 357.2 E11.65

4. Panlobular emphysema (HCC) 492.8 J43.1

5. Hyponatremia due to diabetes mellitus  276.1 E87.1

6. Ataxia new wheel walker prescription sent in  781.3 R27.0 DME WALKER (AMB)

7. History of falling V15.88 Z91.81

I reviewed the discharge summary,  discharge instructions,  and pertinent 
additional documentation obtained during hospitalization.   I reconciled the 
medications.

I  also reviewed the Transition of Care  documentation done by staff.

The tests that were not available at the time of discharge were reviewed.

Additional tests which are not yet available include:  None

Coordination of care.

- I am satisfied that  appropriate referrals are in place to deal with the 
problems identified during hospitalization,  and that the patient has adequate 
community resources  and support  in place.

I confirmed the patient's understanding of the diagnosis and plan of care.

Specific education that was provided today:

Patient Instructions

Continue the insulin at current doses

Stop sertraline use fluoxitene once daily for depression

Trazodone 1 pill at bedtime for sleep and depression

Finger stick three times daily

Bring in the log book with sugars on it next office visit



The current and discharge medications were reconciled by me,  today

The source document was hospital discharge summary

Electronically signed by Colton Norris MD at 2020  1:33 PM 
ESTdocumented in this encounter



Plan of Treatment







 Date  Type  Specialty  Care Team  Description

 

 2020  Office Visit  Internal Medicine  [address]



  



       [address]



  



       [place] [address]



  



       [phone] (Fax)  

 

 2020  Office Visit  Internal Medicine  Colton Norris MD



  



       [address]



  



       [place] [address]



  



       [phone] (Fax)  









 Health Maintenance  Due Date  Last Done  Comments

 

 Diabetic Eye Exam  1950    

 

 MEDICARE ANNUAL WELLNESS  1950    



 VISIT      

 

 HEPATITIS A IMMUNIZATION  10/11/1951    



 SERIES (1 of 2 - Risk 2-dose      



 series)      

 

 ZOSTER IMMUNIZATION SERIES  10/11/1998    



 (1 of 2)      

 

 DTaP/Tdap/Td Vaccines (2 -  10/20/2018  10/20/2008  



 Tdap)      

 

 OSTEOPOROSIS SCREENING  2018, 2008  

 

 FOOT EXAM  2019, 2018,  



     2018, Additional  



     history exists  

 

 INFLUENZA VACCINE (#1)  2019, 2018,  



     2016, Additional  



     history exists  

 

 HEMOGLOBIN A1C  2020, 10/24/2019,  



     2019, Additional  



     history exists  

 

 DEPRESSION SCREENING  2020  

 

 FALL RISK ASSESSMENT  2020, 2019  

 

 LIPID DISORDER SCREENING  2020, 2019,  



     2019, Additional  



     history exists  

 

 MAMMOGRAM (SCREENING)  2020, 2017,  



     2015, Additional  



     history exists  

 

 Colonoscopy  10/06/2026  10/06/2016, 2009  

 

 PNEUMOCOCCAL 65+YRS  Completed  2019, 2016,  



     10/04/2006, Additional  



     history exists  

 

 HPV IMMUNIZATION SERIES  Aged Out    No longer eligible



       based on patient's age



       to complete this topic

 

 MENINGOCOCCAL VACCINE IMM  Aged Out    No longer eligible



       based on patient's age



       to complete this topic



documented as of this encounter



Goals







 Goal  Patient Goal  Associated  Recent  Patient-Stated?  Author



   Type  Problems  Progress    

 

 Blood Pressure  Blood  Hypertension  132/74  No  [place],



 < 140/90  Pressure    (2020    Colton BRASWELL,



       1:11 PM EST)    MD









 Note: 



Hypertension Care Plan



 



 Based on the patient's clinical history and according to JNC 8 guidelines 
target blood pressure goal is less than 140/90. Based on the patient's last 
blood pressure of BP: 140/70 mmHg the patient is at at goal.



 



 As your provider, it is important that I advise you regarding:



 



 

 your current medications and help you with any challenges you may face 
taking your medications as directed (ex. instructions, cost, side effects, and 
interactions).



 



 

 Important lifestyle changes: exercise, weight reduction, dietary sodium 
reduction and medication compliance



 



 

 your clinical goals and how you can achieve success: weight reduction, 
exercise plan and diet improvements



 



 

 medication management: N/A diet only



 



 

 patient education/self-management tools provided: Current self-management 
tools adequate



 



 To successfully manage my Hypertension I will:



 



 

 monitor my blood pressure daily, understanding that my goal is less than 140/
90 per my healthcare provider's recommendation. I will schedule an appointment 
with my provider if consistent abnormal readings greater than 160/100.



 



 

 take medications every day as prescribed by my healthcare provider and if 
unable to take them I will discuss with my provider.



 



 

 monitor for symptoms of chest pain, chest tightness/pressure, irregular 
heartbeat, persistent dizziness, radiating arm pain, and neck or jaw pain. If 
any of these symptoms are noticed I will seek medical attention immediately by 
calling 911



 



 

 exercise/walk 30 minutes 5 day(s) per week. If I experience chest pain, 
chest tightness, or shortness of breath, I will seek medical attention 
immediately.



 



 

 follow a diet rich in fruits, vegetables, and low-fat dairy products with 
reduced content of saturated & total fat. I will reduce my sodium intake daily. 
An example is the DASH diet. To obtain



 more information please refer to the DASH Eating Plan listed in Educational 
Resources.



 



 

 record my blood pressure results. [employer] is safe and secure way for you to 
do this in your medical record online.



 



 

 try to obtain an ideal body weight. My recent weight was Weight: 197 lb (
89.359 kg). My weight loss goal for my next office visit is 185 .



 



 

 limit alcohol consumption. For men two drinks per day and women one drink 
per day.



 



 

 if currently smoking, will discuss how to quit smoking with my healthcare 
provider and work towards quitting.



 



 Educational Resources:



 



 National Heart, Lung, & Blood Bloomer http://nhlbi.nih.gov/hbp/index.html



 The DASH Diet Eating Plan     http://www.nhlbi.nih.gov/health/health-topics/
topics/dash/



 Academy of Nutrition & DIetetics  http://eatright.org



 National Smoking Cessation Site  http://smokefree.gov









 Blood Pressure <  Blood Pressure  Hypertension  132/74 (2020  Colton Juárez



 140/90      1:11 PM NOEMY BRASWELL MD









 Note: 



Hypertension Care Plan



 



 Based on the patient's clinical history and according to JNC 8 guidelines 
target blood pressure goal is less than 140/90. Based on the patient's last 
blood pressure of BP: 122/70 mmHg the patient is at at goal.



 



 As your provider, it is important that I advise you regarding:



 



 

 your current medications and help you with any challenges you may face 
taking your medications as directed (ex. instructions, cost, side effects, and 
interactions).



 



 

 Important lifestyle changes: exercise, weight reduction, diet and dietary 
sodium reduction



 



 

 your clinical goals and how you can achieve success: weight reduction and 
exercise plan



 



 

 medication management: N/A diet only



 



 

 patient education/self-management tools provided: Current self-management 
tools adequate



 



 To successfully manage my Hypertension I will:



 



 

 monitor my blood pressure daily, understanding that my goal is less than 140/
90 per my healthcare provider's recommendation. I will schedule an appointment 
with my provider if consistent abnormal readings greater than 160/100.



 



 

 take medications every day as prescribed by my healthcare provider and if 
unable to take them I will discuss with my provider.



 



 

 monitor for symptoms of chest pain, chest tightness/pressure, irregular 
heartbeat, persistent dizziness, radiating arm pain, and neck or jaw pain. If 
any of these symptoms are noticed I will seek medical attention immediately by 
calling 911



 



 

 exercise/walk 30 minutes 6 day(s) per week. If I experience chest pain, 
chest tightness, or shortness of breath, I will seek medical attention 
immediately.



 



 

 follow a diet rich in fruits, vegetables, and low-fat dairy products with 
reduced content of saturated & total fat. I will reduce my sodium intake daily. 
An example is the DASH diet. To obtain



 more information please refer to the DASH Eating Plan listed in Educational 
Resources.



 



 

 record my blood pressure results. [employer] is safe and secure way for you to 
do this in your medical record online.



 



 

 try to obtain an ideal body weight. My recent weight was Weight: 190 lb (
86.183 kg). My weight loss goal for my next office visit is 180 .



 



 

 limit alcohol consumption. For men two drinks per day and women one drink 
per day.



 



 

 if currently smoking, will discuss how to quit smoking with my healthcare 
provider and work towards quitting.



 



 Educational Resources:



 



 National Heart, Lung, & Blood Bloomer http://nhlbi.nih.gov/hbp/index.html



 The DASH Diet Eating Plan     http://www.nhlbi.nih.gov/health/health-topics/
topics/dash/



 Academy of Nutrition & DIetetics  http://eatright.org



 National Smoking Cessation Site  http://smokefree.gov









 Blood Pressure <  Blood Pressure    132/74 (2020  1:11  Colton Juárez,



 140/90      PM EST)    MD









 Note: 



This is an individualized treatment (blood pressure) goal for Sabrina Hylton:



 Displayed above (on the left) is your goal for blood pressure control.  Your 
most recent blood pressure is also shown above, on the right.



 You should try to achieve blood pressures that are lower than your goal listed 
above (on the left).









 Weight increase vs. 18 mo  CHF    33 (2020  1:11 PM EST)  Colton Juárez MD



 min (lbs) < 5          









 Note: 



This is an individualized treatment (congestive heart failure, CHF) goal for 
Sabrina Hylton:



 Displayed above (on the right) is how many pounds you are in excess of your 
lowest weight over the past 18 months.  Note that lower numbers are better.  
Excessive weight gain often indicates fluid reten



 tion and worsening heart failure.  You should contact your doctor immediately 
if the above number is too high (above your goal, the number on the left).









 Depression screen (PHQ-9) total score < 5  Depression      Colton Juárez MD









 Note: 



This is an individualized treatment (depression) goal for Sabrina DENNIS Selena:



 Displayed above is your goal for a depression screening (PHQ-9) score that 
would indicate good control of your depression.









 Diabetes < 7.0  Diabetes  Uncontrolled type 2  9.3 (2019  Colton Juárez



     diabetes mellitus with  9:09 AM EST)    MD MICHAELLE



     insulin therapy      









 Note: 



Formatting of this note might be different from the original.



 Diabetes Care Plan



 



 According to current 2014 ADA guidelines the patient A1C goal is less than 7. 
The patient's last A1C was



 Lab Results



 Lab Results Value Date/Time



  GLYCO 7.7 2015 1200



  GLYCO 8.2 2011 1020



 



 The patient is:above goal .



 



 As your provider, it is important that I advise you regarding:



 



 

 your current medications and help you with any challenges you may face 
taking your medications as directed (ex. instructions, cost, side effects, and 
interactions).



 



 

 Important lifestyle changes:exercise, diet, glucose monitoring and 
medication compliance



 



 

 your clinical goals and how you can achieve success:weight reduction, 
exercise plan, diet management and glucose monitoring



 



 

 medication management: adjusted medications as appropriate



 



 

 patient education/self-management tools provided: Current self-management 
tools adequate



 



 To successfully manage my Diabetes I will:



 



 

 have lab work every six months if my previous A1c was 7 or less. If my 
results were greater than 7, I will have lab work every three months. My goal 
is to control my diabetes by keeping A1c below 7.0



 



 

 take medications every day as prescribed by my healthcare provider and if 
unable to take them I will discuss with my provider.



 



 

 exercise/walk 30 minutes 6 day(s) per week. If I experience chest pain, 
chest tightness, or shortness of breath, I will seek medical attention 
immediately.



 



 

 check feet daily. If sores or irritation are noticed, will seek medical 
attention.



 



 

 follow a low carbohydrate and low fat diet. My goal is an LDL (bad 
cholesterol) number less than 100 when I have my routine lab work.



 



 

 check blood sugar as instructed and will call my healthcare provider if the 
results are consistently below 70 or above 300. I will monitor for symptoms of 
low blood sugar (feeling faint, dizzy, lig



 htheaded, jittery, sweaty, or hungry), if symptoms are noticed, I will eat or 
drink something (glucose tabs, orange juice, candy) to help raise sugar.



 



 

 record my blood sugar results (including dextrose sticks). [employer] is safe 
and secure way for you to do this in your medical record online.



 



 

 try to obtain an ideal body weight. My recent weight was Weight: 190 lb (
86.183 kg). My weight loss goal for my next office visit is 180 pounds .



 



 

 to prevent kidney problems common to people with diabetes I will complete a 
yearly Microalbumin to check for protein in urine. I will talk with my 
healthcare provider about medications to prevent diabetic renal disease.



 



 

 to prevent diabetic retinopathy I will see an eye doctor yearly. A yearly 
dilated eye exam helps prevent blindness.



 



 

 if currently smoking, will discuss how to quit smoking with my healthcare 
provider and work towards quitting.









 Glycohemoglobin A1c < 7.0  Diabetes    9.3 (2019  9:09 AM  Colton Juárez EST) MD









 Note: 



This is an individualized treatment (diabetes control, HgbA1C) goal for Sabrina Hylton:



 Displayed above is your progress towards your HgbA1C goal.  Your goal is shown 
above (on the left); your most recent HgbA1C is shown on the right.  Note that 
lower numbers are better.









 Keep immunizations current  Lifestyle      Colton Juárez MD









 Note: 



This is an individualized lifestyle goal for Sabrina Hylton:



 Please be sure to keep up-to-date on recommended immunizations.  For example, 
this would include a yearly influenza vaccine.



 Immunization status can be seen by looking at the Health Maintenance sections 
of your [facility], Plan of Care, and any After Visit Summaries.









 Consume a no-added-salt diet  Lifestyle      Colton Juárez MD









 Note: 



This is an individualized lifestyle goal for Sabrina Hylton:



 Please do not add additional salt to your food.  Additional salt may lead to 
fluid retention and worsen your congestive heart failure.









 Keep a regular sleep schedule  Lifestyle      Colton Juárez MD









 Note: 



This is an individualized lifestyle goal for Sabrina Hylton:



 Please maintain a regular sleep schedule.  This may help with some symptoms of 
depression.









 Take all prescribed medications as  Self-management      Colton Juárez MD



 directed          









 Note: 



This is an individualized self-management goal for Sabrina Hylton:



 Please take all prescribed medications as directed.



 1.  Do not skip doses.  If you cannot afford your medications, talk with your 
doctor.



 2.  Use a pill reminder system such as a pill box if needed.  Your pharmacist 
can help you with this.



 3.  Contact your Pharmacy 5 days before your medication runs out.  If you 
cannot take your medications for any reasons, talk with your doctor.



 4.  Please bring all of your medication bottles and inhalers (or a list of all 
your medications/inhalers) with you to every visit.



 Potential barriers to meeting all of your care plan goals will continue to be 
addressed on an ongoing basis.









 Check your weight daily  Self-management      Colton Juárez MD









 Note: 



This is an individualized self-management goal for Sabrina Hylton:



 Please check your weight daily.  Refer to the accompanying CHF treatment goal 
and call your doctor immediately for further instructions on how to respond to 
unexpected weight gain.



documented as of this encounter



Results

Not on filedocumented in this encounter



Visit Diagnoses







 Diagnosis

 

 Grief reaction







 Adjustment disorder with depressed mood

 

 Situational anxiety







 Other anxiety states

 

 Uncontrolled type 2 diabetes mellitus with peripheral neuropathy (HCC)

 

 Panlobular emphysema (HCC)







 Other emphysema

 

 Hyponatremia







 Hyposmolality and/or hyponatremia

 

 Ataxia







 Lack of coordination

 

 History of falling







 Personal history of fall



documented in this encounter



Insurance







 Payer  Benefit Plan / Group  Subscriber ID  Effective Dates  Phone  Address  
Type

 

 MEDICARE  MEDICARE PART A & B  xxxxxxxxxxx  10/1/2013-Present      Medicare









 Guarantor Name  Account Type  Relation to  Date of Birth  Phone  Billing



     Patient      Address

 

 Sabrina Hylton  Personal/Family    1950  [phone]  [address] [place] [address]







         (Work)  [place], [place]



           [id number]



documented as of this encounter



Advance Directives







 Type  Date Recorded  Patient Representative  Explanation

 

 Advance Directives  2018 11:33 AM    [facility]



       change form

 

 Advance Directives  10/10/2019  8:55 AM    HEALTH CARE PROXY
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
## 2020-01-25 LAB
ANION GAP SERPL CALC-SCNC: 10 MMOL/L (ref 2–11)
BASOPHILS # BLD AUTO: 0 10^3/UL (ref 0–0.2)
BUN SERPL-MCNC: 50 MG/DL (ref 6–24)
BUN/CREAT SERPL: 27.8 (ref 8–20)
CALCIUM SERPL-MCNC: 8.7 MG/DL (ref 8.6–10.3)
CHLORIDE SERPL-SCNC: 98 MMOL/L (ref 101–111)
EOSINOPHIL # BLD AUTO: 0 10^3/UL (ref 0–0.6)
GLUCOSE SERPL-MCNC: 429 MG/DL (ref 70–100)
HCO3 SERPL-SCNC: 22 MMOL/L (ref 22–32)
HCT VFR BLD AUTO: 30 % (ref 35–47)
HGB BLD-MCNC: 10.2 G/DL (ref 12–16)
LYMPHOCYTES # BLD AUTO: 0.3 10^3/UL (ref 1–4.8)
MAGNESIUM SERPL-MCNC: 2 MG/DL (ref 1.9–2.7)
MCH RBC QN AUTO: 29 PG (ref 27–31)
MCHC RBC AUTO-ENTMCNC: 34 G/DL (ref 31–36)
MCV RBC AUTO: 86 FL (ref 80–97)
MONOCYTES # BLD AUTO: 0.2 10^3/UL (ref 0–0.8)
NEUTROPHILS # BLD AUTO: 7.3 10^3/UL (ref 1.5–7.7)
NRBC # BLD AUTO: 0 10^3/UL
NRBC BLD QL AUTO: 0
PLATELET # BLD AUTO: 115 10^3/UL (ref 150–450)
POTASSIUM SERPL-SCNC: 4.3 MMOL/L (ref 3.5–5)
RBC # BLD AUTO: 3.51 10^6 /UL (ref 3.7–4.87)
SODIUM SERPL-SCNC: 130 MMOL/L (ref 135–145)
WBC # BLD AUTO: 7.8 10^3/UL (ref 3.5–10.8)

## 2020-01-25 RX ADMIN — LEVOTHYROXINE SODIUM SCH MCG: 25 TABLET ORAL at 06:24

## 2020-01-25 RX ADMIN — GABAPENTIN SCH MG: 400 CAPSULE ORAL at 20:12

## 2020-01-25 RX ADMIN — GUAIFENESIN SCH MG: 600 TABLET, EXTENDED RELEASE ORAL at 08:42

## 2020-01-25 RX ADMIN — INSULIN LISPRO SCH UNITS: 100 INJECTION, SOLUTION INTRAVENOUS; SUBCUTANEOUS at 20:12

## 2020-01-25 RX ADMIN — TRAZODONE HYDROCHLORIDE SCH MG: 50 TABLET ORAL at 20:12

## 2020-01-25 RX ADMIN — OSELTAMIVIR PHOSPHATE SCH MG: 30 CAPSULE ORAL at 08:44

## 2020-01-25 RX ADMIN — INSULIN LISPRO SCH UNITS: 100 INJECTION, SOLUTION INTRAVENOUS; SUBCUTANEOUS at 08:42

## 2020-01-25 RX ADMIN — FLUOXETINE SCH MG: 20 CAPSULE ORAL at 08:42

## 2020-01-25 RX ADMIN — ENOXAPARIN SODIUM SCH MG: 30 INJECTION SUBCUTANEOUS at 16:46

## 2020-01-25 RX ADMIN — INSULIN LISPRO SCH UNITS: 100 INJECTION, SOLUTION INTRAVENOUS; SUBCUTANEOUS at 16:47

## 2020-01-25 RX ADMIN — ATORVASTATIN CALCIUM SCH MG: 40 TABLET, FILM COATED ORAL at 08:42

## 2020-01-25 RX ADMIN — INSULIN GLARGINE SCH UNIT: 100 INJECTION, SOLUTION SUBCUTANEOUS at 16:46

## 2020-01-25 RX ADMIN — GUAIFENESIN SCH MG: 600 TABLET, EXTENDED RELEASE ORAL at 20:12

## 2020-01-25 RX ADMIN — OSELTAMIVIR PHOSPHATE SCH MG: 30 CAPSULE ORAL at 20:11

## 2020-01-25 RX ADMIN — TORSEMIDE SCH MG: 20 TABLET ORAL at 08:45

## 2020-01-25 RX ADMIN — INSULIN LISPRO SCH UNITS: 100 INJECTION, SOLUTION INTRAVENOUS; SUBCUTANEOUS at 11:58

## 2020-01-25 RX ADMIN — Medication SCH TAB: at 08:43

## 2020-01-25 RX ADMIN — INSULIN GLARGINE SCH UNITS: 100 INJECTION, SOLUTION SUBCUTANEOUS at 05:41

## 2020-01-25 RX ADMIN — BENZONATATE PRN MG: 100 CAPSULE ORAL at 01:27

## 2020-01-25 RX ADMIN — SPIRONOLACTONE SCH MG: 25 TABLET ORAL at 08:42

## 2020-01-25 RX ADMIN — BISOPROLOL FUMARATE SCH MG: 5 TABLET ORAL at 08:45

## 2020-01-25 NOTE — PN
Subjective


Date of Service: 01/25/20


Interval History: 





Patient is feeling much better today. Patient is still lethargic, but is better 

than yesterday. Patient still has SOB and Muscle Aches. Patient denies CP, F/C, 

N/V, abdominal pain, diarrhea, or other pain. Patient has not had a BM since D/

C from [place]. 


Family History: Unchanged from Admission


Social History: Unchanged from Admission


Past Medical History: Unchanged from Admission





Objective


Active Medications: 








Acetaminophen (Tylenol Tab*)  650 mg PO Q6H PRN


   PRN Reason: MILD PAIN or TEMP > 100.4


   Last Admin: 01/24/20 20:31 Dose:  650 mg


Albuterol/Ipratropium (Duoneb (Albuterol 2.5 Mg/Ipratropium 0.5 Mg))  1 neb INH 

Q6H PRN


   PRN Reason: SOB/WHEEZING


Atorvastatin Calcium (Lipitor*)  40 mg PO DAILY [facility]


   Last Admin: 01/25/20 08:42 Dose:  40 mg


Benzonatate (Tessalon Cap*)  100 mg PO BID PRN


   PRN Reason: COUGH


   Last Admin: 01/25/20 01:27 Dose:  100 mg


Bisoprolol Fumarate (Zebeta Tab*)  10 mg PO DAILY [facility]


   Last Admin: 01/25/20 08:45 Dose:  10 mg


Dextrose (D50w Syringe 50 Ml*)  12.5 gm IV PUSH .FOR FS < 60 - SS PRN


   PRN Reason: FS < 60


Enoxaparin Sodium (Lovenox(*))  30 mg SUBCUT Q24H [facility]


   Last Admin: 01/24/20 20:28 Dose:  30 mg


Fluoxetine HCl (Prozac Cap*)  20 mg PO DAILY [facility]


   Last Admin: 01/25/20 08:42 Dose:  20 mg


Gabapentin (Neurontin Cap(*))  400 mg PO BEDTIME [facility]


   Last Admin: 01/24/20 20:30 Dose:  400 mg


Guaifenesin (Mucinex*)  1,200 mg PO BID [facility]


   Last Admin: 01/25/20 08:42 Dose:  1,200 mg


Levofloxacin/Dextrose (Levaquin 750 Mg Ivpremix(*))  750 mg in 150 mls @ 100 mls

/hr IVPB Q48H [facility]; Protocol


Insulin Glargine (Lantus(*))  50 units SUBCUT Q12H [facility]


Insulin Human Lispro (Humalog*)  0 units SUBCUT ACHS [facility]; Protocol


   Last Admin: 01/25/20 11:58 Dose:  6 units


Lactobacillus Rhamnosus (Lactobacillus Acidophilus*)  1 tab PO DAILY [facility]


   Last Admin: 01/25/20 08:43 Dose:  1 tab


Levothyroxine Sodium (Synthroid Tab*)  25 mcg PO QAM@0600 [facility]


   Last Admin: 01/25/20 06:24 Dose:  25 mcg


Nystatin (Nystatin Top Powder*)  1 applic TOPICAL TID PRN


   PRN Reason: RASH


Ondansetron HCl (Zofran Inj*)  4 mg IV Q6H PRN


   PRN Reason: NAUSEA


   Last Admin: 01/24/20 20:25 Dose:  4 mg


Oseltamivir Phosphate (Tamiflu Cap*)  30 mg PO BID [facility]


   Stop: 01/29/20 09:01


   Last Admin: 01/25/20 08:44 Dose:  30 mg


Spironolactone (Aldactone Tab*)  25 mg PO DAILY [facility]


   Last Admin: 01/25/20 08:42 Dose:  25 mg


Torsemide (Demadex*)  20 mg PO DAILY [facility]


   Last Admin: 01/25/20 08:45 Dose:  20 mg


Trazodone HCl (Desyrel Tab*)  50 mg PO BEDTIME [facility]


   Last Admin: 01/24/20 20:31 Dose:  50 mg








 Vital Signs - 8 hr











  01/25/20 01/25/20 01/25/20





  07:15 08:00 11:15


 


Temperature 97.3 F  97.2 F


 


Pulse Rate 60  54


 


Respiratory 20 20 20





Rate   


 


Blood Pressure 105/42  92/45





(mmHg)   


 


O2 Sat by Pulse 95 95 100





Oximetry   














  01/25/20





  12:11


 


Temperature 


 


Pulse Rate 


 


Respiratory 





Rate 


 


Blood Pressure 98/44





(mmHg) 


 


O2 Sat by Pulse 





Oximetry 











Oxygen Devices in Use Now: Nasal Cannula


Appearance: Patient is a 68yo female who appears stated age and is sitting in 

the bed in NAD.


Eyes: No Scleral Icterus, PERRLA


Ears/Nose/Mouth/Throat: NL Teeth, Lips, Gums, Clear Oropharnyx, Mucous 

Membranes Moist


Neck: NL Appearance and Movements; NL JVP, Trachea Midline


Respiratory: Symmetrical Chest Expansion and Respiratory Effort, - - Diminished

, mainly in bases. 


Cardiovascular: NL Sounds; No Murmurs; No JVD, RRR, No Edema


Abdominal: NL Sounds; No Tenderness; No Distention, No Hepatosplenomegaly


Lymphatic: No Cervical Adenopathy


Extremities: No Edema, No Clubbing, Cyanosis


Skin: No Rash or Ulcers, No Nodules or Sclerosis


Neurological: Alert and Oriented x 3, NL Sensation, NL Muscle Strength and Tone

, - - CN II-XII intact. 


Result Diagrams: 


 01/25/20 04:58





 01/25/20 04:58


Microbiology and Other Data: 


 Microbiology











 01/24/20 20:20 Gram Stain - Final





 Sputum 


 


 01/24/20 20:20 Nasal Screen MRSA (PCR) - Final





 Nasal    Mrsa Not Detected


 


 01/24/20 20:20 Legionella Urinary Antigen - Final





 Urine    Negative Legionella Antigen





 Streptococcus pneumoniae Ag Screen - Final





    Negative S. pneumo Antigen














Assess/Plan/Problems-Billing


Assessment: 





Patient is a 68yo female with a PMH for COPD, DM II, CAD, Cirrhosis, CKD, here 

with Flu and likely bacterial superinfection who is improving on antibiotics 

and tamiflu. 





- Patient Problems


(1) Acute respiratory failure with hypoxia


Current Visit: Yes   Status: Acute   Code(s): J96.01 - ACUTE RESPIRATORY 

FAILURE WITH HYPOXIA   SNOMED Code(s): 80571751


   Comment: 


- Requiring 4L initially, now down to 3L


- Due to Flu and Likely bacterial Superinfection


- Treat with Tamiflu and Levaquin


- Possible Slight contributions from COPD and CHF exacerbations, but without 

wheezing or rales, will not treat with steroids or increased diuresis. Got 80mg 

IV Lasix in ED on 1/24   





(2) CAD (coronary artery disease)


Current Visit: No   Status: Acute   Priority: Medium   Code(s): I25.10 - ATHSCL 

HEART DISEASE OF NATIVE CORONARY ARTERY W/O ANG PCTRS   SNOMED Code(s): 32005455


   Comment: 


- Continue statin, not on ASA or Plavix


- No signs of ACS.    





(3) Chronic diastolic (congestive) heart failure


Current Visit: No   Status: Acute   Code(s): I50.32 - CHRONIC DIASTOLIC (

CONGESTIVE) HEART FAILURE   SNOMED Code(s): 694663787


   Comment: 


- Mild exacerbation, possibly resolved


- Continue home diuretics.    





(4) History of cirrhosis of liver


Current Visit: No   Status: Chronic   Priority: Medium   Code(s): Z87.19 - 

PERSONAL HISTORY OF OTHER DISEASES OF THE DIGESTIVE SYSTEM   SNOMED Code(s): 

811068494


   Comment: 


- Due to Hep C


- States Hep C never treated, follow up outpatient.    





(5) Hyperlipemia


Current Visit: No   Status: Chronic   Priority: Low   Code(s): E78.5 - 

HYPERLIPIDEMIA, UNSPECIFIED   SNOMED Code(s): 94345433


   Comment: 


- Continue atorvastatin   





(6) Hypertension


Current Visit: No   Status: Chronic   Priority: Medium   Code(s): I10 - 

ESSENTIAL (PRIMARY) HYPERTENSION   SNOMED Code(s): 38250984


   Comment: 


- Borderline low BP


- Continue Bisoprolol, torsemide and spironolactone   





(7) Obstructive sleep apnea


Current Visit: No   Status: Chronic   Priority: Medium   Code(s): G47.33 - 

OBSTRUCTIVE SLEEP APNEA (ADULT) (PEDIATRIC)   SNOMED Code(s): 90216570


   Comment: 


- Continue O2 overnight.    





(8) Type 2 diabetes mellitus


Current Visit: No   Status: Chronic   Priority: Medium   Comment: 


- Poor control due to steroids in ED. 


- SSI and 50u Glargine BID


- Recently decreased lantus due to hypoglycemia at [place].    





(9) DVT prophylaxis


Current Visit: No   Status: Acute   Priority: Medium   Code(s): XBX3089 -    

SNOMED Code(s): 216984887


   Comment: 


- Lovenox   


Status and Disposition: 





Inpatient for Respiratory failure

## 2020-01-26 LAB
ANION GAP SERPL CALC-SCNC: 8 MMOL/L (ref 2–11)
BASOPHILS # BLD AUTO: 0 10^3/UL (ref 0–0.2)
BUN SERPL-MCNC: 67 MG/DL (ref 6–24)
BUN/CREAT SERPL: 37.9 (ref 8–20)
CALCIUM SERPL-MCNC: 8.8 MG/DL (ref 8.6–10.3)
CHLORIDE SERPL-SCNC: 98 MMOL/L (ref 101–111)
EOSINOPHIL # BLD AUTO: 0 10^3/UL (ref 0–0.6)
GLUCOSE SERPL-MCNC: 203 MG/DL (ref 70–100)
HCO3 SERPL-SCNC: 23 MMOL/L (ref 22–32)
HCT VFR BLD AUTO: 30 % (ref 35–47)
HGB BLD-MCNC: 10.2 G/DL (ref 12–16)
LYMPHOCYTES # BLD AUTO: 0.5 10^3/UL (ref 1–4.8)
MAGNESIUM SERPL-MCNC: 2.1 MG/DL (ref 1.9–2.7)
MCH RBC QN AUTO: 29 PG (ref 27–31)
MCHC RBC AUTO-ENTMCNC: 34 G/DL (ref 31–36)
MCV RBC AUTO: 85 FL (ref 80–97)
MONOCYTES # BLD AUTO: 0.3 10^3/UL (ref 0–0.8)
NEUTROPHILS # BLD AUTO: 7.1 10^3/UL (ref 1.5–7.7)
NRBC # BLD AUTO: 0 10^3/UL
NRBC BLD QL AUTO: 0
PLATELET # BLD AUTO: 145 10^3/UL (ref 150–450)
POTASSIUM SERPL-SCNC: 4.9 MMOL/L (ref 3.5–5)
RBC # BLD AUTO: 3.5 10^6 /UL (ref 3.7–4.87)
SODIUM SERPL-SCNC: 129 MMOL/L (ref 135–145)
WBC # BLD AUTO: 8 10^3/UL (ref 3.5–10.8)

## 2020-01-26 RX ADMIN — INSULIN LISPRO SCH UNITS: 100 INJECTION, SOLUTION INTRAVENOUS; SUBCUTANEOUS at 17:23

## 2020-01-26 RX ADMIN — INSULIN LISPRO SCH UNITS: 100 INJECTION, SOLUTION INTRAVENOUS; SUBCUTANEOUS at 08:38

## 2020-01-26 RX ADMIN — BENZONATATE SCH: 100 CAPSULE ORAL at 08:55

## 2020-01-26 RX ADMIN — IPRATROPIUM BROMIDE AND ALBUTEROL SULFATE PRN NEB: .5; 3 SOLUTION RESPIRATORY (INHALATION) at 08:54

## 2020-01-26 RX ADMIN — OSELTAMIVIR PHOSPHATE SCH MG: 30 CAPSULE ORAL at 08:38

## 2020-01-26 RX ADMIN — LEVOTHYROXINE SODIUM SCH MCG: 25 TABLET ORAL at 05:40

## 2020-01-26 RX ADMIN — GUAIFENESIN SCH MG: 600 TABLET, EXTENDED RELEASE ORAL at 20:28

## 2020-01-26 RX ADMIN — ATORVASTATIN CALCIUM SCH MG: 40 TABLET, FILM COATED ORAL at 08:38

## 2020-01-26 RX ADMIN — Medication SCH TAB: at 08:38

## 2020-01-26 RX ADMIN — OSELTAMIVIR PHOSPHATE SCH MG: 30 CAPSULE ORAL at 20:29

## 2020-01-26 RX ADMIN — INSULIN LISPRO SCH UNITS: 100 INJECTION, SOLUTION INTRAVENOUS; SUBCUTANEOUS at 22:40

## 2020-01-26 RX ADMIN — TORSEMIDE SCH MG: 20 TABLET ORAL at 08:40

## 2020-01-26 RX ADMIN — GABAPENTIN SCH MG: 400 CAPSULE ORAL at 20:33

## 2020-01-26 RX ADMIN — NITROFURANTOIN MACROCRYSTALS SCH MG: 50 CAPSULE ORAL at 20:30

## 2020-01-26 RX ADMIN — GUAIFENESIN SCH MG: 600 TABLET, EXTENDED RELEASE ORAL at 08:38

## 2020-01-26 RX ADMIN — ENOXAPARIN SODIUM SCH MG: 30 INJECTION SUBCUTANEOUS at 17:23

## 2020-01-26 RX ADMIN — BENZONATATE PRN MG: 100 CAPSULE ORAL at 08:44

## 2020-01-26 RX ADMIN — IPRATROPIUM BROMIDE AND ALBUTEROL SULFATE PRN NEB: .5; 3 SOLUTION RESPIRATORY (INHALATION) at 20:26

## 2020-01-26 RX ADMIN — TRAZODONE HYDROCHLORIDE SCH MG: 50 TABLET ORAL at 20:29

## 2020-01-26 RX ADMIN — BENZONATATE SCH MG: 100 CAPSULE ORAL at 20:30

## 2020-01-26 RX ADMIN — ACETAMINOPHEN PRN MG: 325 TABLET ORAL at 04:12

## 2020-01-26 RX ADMIN — FLUOXETINE SCH MG: 20 CAPSULE ORAL at 08:38

## 2020-01-26 RX ADMIN — ACETAMINOPHEN PRN MG: 325 TABLET ORAL at 22:40

## 2020-01-26 RX ADMIN — BISOPROLOL FUMARATE SCH MG: 5 TABLET ORAL at 08:39

## 2020-01-26 RX ADMIN — INSULIN GLARGINE SCH UNITS: 100 INJECTION, SOLUTION SUBCUTANEOUS at 17:23

## 2020-01-26 RX ADMIN — INSULIN GLARGINE SCH UNIT: 100 INJECTION, SOLUTION SUBCUTANEOUS at 05:41

## 2020-01-26 RX ADMIN — INSULIN LISPRO SCH UNITS: 100 INJECTION, SOLUTION INTRAVENOUS; SUBCUTANEOUS at 11:47

## 2020-01-26 RX ADMIN — NITROFURANTOIN MACROCRYSTALS SCH MG: 50 CAPSULE ORAL at 11:49

## 2020-01-26 RX ADMIN — SPIRONOLACTONE SCH MG: 25 TABLET ORAL at 08:39

## 2020-01-26 RX ADMIN — BENZONATATE SCH MG: 100 CAPSULE ORAL at 12:56

## 2020-01-26 NOTE — PN
Subjective


Date of Service: 01/26/20


Interval History: 





Patient had a "rough night" due to coughing and lack of sleeping. Patient still 

feels poorly and SOB. Patient has been getting cold chills. Patient denies CP, N

/V, abdominal pain, diarrhea. Patient states she doesn't want to be discharged 

until she feels "much better"


Family History: Unchanged from Admission


Social History: Unchanged from Admission


Past Medical History: Unchanged from Admission





Objective


Active Medications: 








Acetaminophen (Tylenol Tab*)  650 mg PO Q6H PRN


   PRN Reason: MILD PAIN or TEMP > 100.4


   Last Admin: 01/26/20 04:12 Dose:  650 mg


Albuterol/Ipratropium (Duoneb (Albuterol 2.5 Mg/Ipratropium 0.5 Mg))  1 neb INH 

Q6H PRN


   PRN Reason: SOB/WHEEZING


   Last Admin: 01/26/20 08:54 Dose:  1 neb


Atorvastatin Calcium (Lipitor*)  40 mg PO DAILY [facility]


   Last Admin: 01/26/20 08:38 Dose:  40 mg


Benzonatate (Tessalon Cap*)  100 mg PO TID [facility]


   Last Admin: 01/26/20 08:55 Dose:  Not Given


Bisoprolol Fumarate (Zebeta Tab*)  10 mg PO DAILY [facility]


   Last Admin: 01/26/20 08:39 Dose:  10 mg


Dextrose (D50w Syringe 50 Ml*)  12.5 gm IV PUSH .FOR FS < 60 - SS PRN


   PRN Reason: FS < 60


Enoxaparin Sodium (Lovenox(*))  30 mg SUBCUT Q24H [facility]


   Last Admin: 01/25/20 16:46 Dose:  30 mg


Fluoxetine HCl (Prozac Cap*)  20 mg PO DAILY [facility]


   Last Admin: 01/26/20 08:38 Dose:  20 mg


Gabapentin (Neurontin Cap(*))  400 mg PO BEDTIME [facility]


   Last Admin: 01/25/20 20:12 Dose:  400 mg


Guaifenesin (Mucinex*)  1,200 mg PO BID [facility]


   Last Admin: 01/26/20 08:38 Dose:  1,200 mg


Levofloxacin/Dextrose (Levaquin 750 Mg Ivpremix(*))  750 mg in 150 mls @ 100 mls

/hr IVPB Q48H [facility]; Protocol


Insulin Glargine (Lantus(*))  50 units SUBCUT Q12H [facility]


   Last Admin: 01/26/20 05:41 Dose:  50 unit


Insulin Human Lispro (Humalog*)  0 units SUBCUT ACHS [facility]; Protocol


   Last Admin: 01/26/20 08:38 Dose:  4 units


Lactobacillus Rhamnosus (Lactobacillus Acidophilus*)  1 tab PO DAILY [facility]


   Last Admin: 01/26/20 08:38 Dose:  1 tab


Levothyroxine Sodium (Synthroid Tab*)  25 mcg PO QAM@0600 [facility]


   Last Admin: 01/26/20 05:40 Dose:  25 mcg


Nystatin (Nystatin Top Powder*)  1 applic TOPICAL TID PRN


   PRN Reason: RASH


Ondansetron HCl (Zofran Inj*)  4 mg IV Q6H PRN


   PRN Reason: NAUSEA


   Last Admin: 01/24/20 20:25 Dose:  4 mg


Oseltamivir Phosphate (Tamiflu Cap*)  30 mg PO BID [facility]


   Stop: 01/29/20 09:01


   Last Admin: 01/26/20 08:38 Dose:  30 mg


Spironolactone (Aldactone Tab*)  25 mg PO DAILY [facility]


   Last Admin: 01/26/20 08:39 Dose:  25 mg


Torsemide (Demadex*)  20 mg PO DAILY [facility]


   Last Admin: 01/26/20 08:40 Dose:  20 mg


Trazodone HCl (Desyrel Tab*)  50 mg PO BEDTIME [facility]


   Last Admin: 01/25/20 20:12 Dose:  50 mg








 Vital Signs - 8 hr











  01/26/20 01/26/20 01/26/20





  03:28 07:15 07:34


 


Temperature 97.2 F 97.5 F 


 


Pulse Rate 56 51 


 


Respiratory 20 22 20





Rate   


 


Blood Pressure 118/49 127/54 





(mmHg)   


 


O2 Sat by Pulse 96 98 





Oximetry   














  01/26/20





  08:00


 


Temperature 


 


Pulse Rate 


 


Respiratory 





Rate 


 


Blood Pressure 





(mmHg) 


 


O2 Sat by Pulse 98





Oximetry 











Oxygen Devices in Use Now: None


Appearance: Patient is a 68yo female who appears stated age and is sitting in 

the bed in NAD.


Eyes: No Scleral Icterus, PERRLA


Ears/Nose/Mouth/Throat: NL Teeth, Lips, Gums, Clear Oropharnyx, Mucous 

Membranes Moist


Neck: NL Appearance and Movements; NL JVP, Trachea Midline


Respiratory: Symmetrical Chest Expansion and Respiratory Effort, - - Mild 

Expiratory wheezing. 


Cardiovascular: NL Sounds; No Murmurs; No JVD, RRR, No Edema


Abdominal: NL Sounds; No Tenderness; No Distention, No Hepatosplenomegaly


Lymphatic: No Cervical Adenopathy


Extremities: No Edema, No Clubbing, Cyanosis


Skin: No Rash or Ulcers, No Nodules or Sclerosis


Neurological: Alert and Oriented x 3, NL Sensation, NL Muscle Strength and Tone

, - - CN II-XII intact. 


Result Diagrams: 


 01/26/20 07:24





 01/26/20 07:24


Microbiology and Other Data: 


 Microbiology











 01/24/20 20:20 Gram Stain - Final





 Sputum 


 


 01/24/20 20:20 Nasal Screen MRSA (PCR) - Final





 Nasal    Mrsa Not Detected


 


 01/24/20 20:20 Legionella Urinary Antigen - Final





 Urine    Negative Legionella Antigen





 Streptococcus pneumoniae Ag Screen - Final





    Negative S. pneumo Antigen














Assess/Plan/Problems-Billing


Assessment: 





Patient is a 68yo female with a PMH for COPD, DM II, CAD, Cirrhosis, CKD, here 

with Flu and likely bacterial superinfection who is improving on antibiotics 

and tamiflu. 





- Patient Problems


(1) Acute respiratory failure with hypoxia


Current Visit: Yes   Status: Acute   Code(s): J96.01 - ACUTE RESPIRATORY 

FAILURE WITH HYPOXIA   SNOMED Code(s): 02408466


   Comment: 


- Resolved


- Due to Flu and Likely bacterial Superinfection


- Treat with Tamiflu and Levaquin


- Possible Slight contributions from COPD and CHF exacerbations. Got 80mg IV 

Lasix in ED on 1/24


- Slight wheezing, continue PRN inhalers   





(2) CAD (coronary artery disease)


Current Visit: No   Status: Acute   Priority: Medium   Code(s): I25.10 - ATHSCL 

HEART DISEASE OF NATIVE CORONARY ARTERY W/O ANG PCTRS   SNOMED Code(s): 65897827


   Comment: 


- Continue statin, not on ASA or Plavix


- No signs of ACS.    





(3) Chronic diastolic (congestive) heart failure


Current Visit: No   Status: Acute   Code(s): I50.32 - CHRONIC DIASTOLIC (

CONGESTIVE) HEART FAILURE   SNOMED Code(s): 904070795


   Comment: 


- Mild exacerbation, possibly resolved


- Continue home diuretics.    





(4) History of cirrhosis of liver


Current Visit: No   Status: Chronic   Priority: Medium   Code(s): Z87.19 - 

PERSONAL HISTORY OF OTHER DISEASES OF THE DIGESTIVE SYSTEM   SNOMED Code(s): 

949746538


   Comment: 


- Due to Hep C


- States Hep C never treated, follow up outpatient.    





(5) Hyperlipemia


Current Visit: No   Status: Chronic   Priority: Low   Code(s): E78.5 - 

HYPERLIPIDEMIA, UNSPECIFIED   SNOMED Code(s): 23101935


   Comment: 


- Continue atorvastatin   





(6) Hypertension


Current Visit: No   Status: Chronic   Priority: Medium   Code(s): I10 - 

ESSENTIAL (PRIMARY) HYPERTENSION   SNOMED Code(s): 05812246


   Comment: 


- Borderline low BP


- Continue Bisoprolol, torsemide and spironolactone   





(7) COPD (chronic obstructive pulmonary disease)


Current Visit: No   Status: Chronic   Priority: Medium   Code(s): J44.9 - 

CHRONIC OBSTRUCTIVE PULMONARY DISEASE, UNSPECIFIED   SNOMED Code(s): 68256339


   Comment: 


- Mild Exacerbation due to flu. 


- 1 dose steroids on 1/24. 


- Continue home inhalers and PRN nebs. 


- Gievn mild nature, will not give steroids due to diabetes.    





(8) Obstructive sleep apnea


Current Visit: No   Status: Chronic   Priority: Medium   Code(s): G47.33 - 

OBSTRUCTIVE SLEEP APNEA (ADULT) (PEDIATRIC)   SNOMED Code(s): 37806086


   Comment: 


- Continue O2 overnight.    





(9) Bacteriuria


Current Visit: Yes   Status: Acute   Code(s): R82.71 - BACTERIURIA   SNOMED Code

(s): 12856658


   Comment: 


- >100,000 ESBL E. coli


- Minimal urinary symptoms (Come incontinence preior to admission)


- Treat with Macrobid at reduced dose due to renal dysfunction and resistance 

to Levaquin.    





(10) Type 2 diabetes mellitus


Current Visit: No   Status: Chronic   Priority: Medium   Comment: 


- Improving control 


- SSI and 60u Glargine BID


- Recently decreased lantus due to hypoglycemia at [place].    





(11) DVT prophylaxis


Current Visit: No   Status: Acute   Priority: Medium   Code(s): UUA9307 -    

SNOMED Code(s): 866663617


   Comment: 


- Lovenox   


Status and Disposition: 





Inpatient for Respiratory failure, D/C when medically stable.

## 2020-01-27 LAB
ANION GAP SERPL CALC-SCNC: 6 MMOL/L (ref 2–11)
BASOPHILS # BLD AUTO: 0 10^3/UL (ref 0–0.2)
BUN SERPL-MCNC: 69 MG/DL (ref 6–24)
BUN/CREAT SERPL: 41.1 (ref 8–20)
CALCIUM SERPL-MCNC: 8.6 MG/DL (ref 8.6–10.3)
CHLORIDE SERPL-SCNC: 102 MMOL/L (ref 101–111)
EOSINOPHIL # BLD AUTO: 0 10^3/UL (ref 0–0.6)
GLUCOSE SERPL-MCNC: 79 MG/DL (ref 70–100)
HCO3 SERPL-SCNC: 24 MMOL/L (ref 22–32)
HCT VFR BLD AUTO: 28 % (ref 35–47)
HGB BLD-MCNC: 9.7 G/DL (ref 12–16)
LYMPHOCYTES # BLD AUTO: 0.7 10^3/UL (ref 1–4.8)
MAGNESIUM SERPL-MCNC: 2 MG/DL (ref 1.9–2.7)
MCH RBC QN AUTO: 29 PG (ref 27–31)
MCHC RBC AUTO-ENTMCNC: 34 G/DL (ref 31–36)
MCV RBC AUTO: 86 FL (ref 80–97)
MONOCYTES # BLD AUTO: 0.3 10^3/UL (ref 0–0.8)
NEUTROPHILS # BLD AUTO: 2.4 10^3/UL (ref 1.5–7.7)
NRBC # BLD AUTO: 0 10^3/UL
NRBC BLD QL AUTO: 0.2
PLATELET # BLD AUTO: 134 10^3/UL (ref 150–450)
POTASSIUM SERPL-SCNC: 4.5 MMOL/L (ref 3.5–5)
RBC # BLD AUTO: 3.31 10^6 /UL (ref 3.7–4.87)
SODIUM SERPL-SCNC: 132 MMOL/L (ref 135–145)
WBC # BLD AUTO: 3.4 10^3/UL (ref 3.5–10.8)

## 2020-01-27 RX ADMIN — INSULIN LISPRO SCH UNITS: 100 INJECTION, SOLUTION INTRAVENOUS; SUBCUTANEOUS at 22:09

## 2020-01-27 RX ADMIN — INSULIN LISPRO SCH UNITS: 100 INJECTION, SOLUTION INTRAVENOUS; SUBCUTANEOUS at 13:03

## 2020-01-27 RX ADMIN — NITROFURANTOIN MACROCRYSTALS SCH MG: 50 CAPSULE ORAL at 10:42

## 2020-01-27 RX ADMIN — GUAIFENESIN SCH MG: 600 TABLET, EXTENDED RELEASE ORAL at 10:43

## 2020-01-27 RX ADMIN — ENOXAPARIN SODIUM SCH MG: 30 INJECTION SUBCUTANEOUS at 17:32

## 2020-01-27 RX ADMIN — SPIRONOLACTONE SCH MG: 25 TABLET ORAL at 10:42

## 2020-01-27 RX ADMIN — BENZONATATE SCH MG: 100 CAPSULE ORAL at 20:37

## 2020-01-27 RX ADMIN — GUAIFENESIN SCH MG: 600 TABLET, EXTENDED RELEASE ORAL at 20:38

## 2020-01-27 RX ADMIN — IPRATROPIUM BROMIDE AND ALBUTEROL SULFATE PRN NEB: .5; 3 SOLUTION RESPIRATORY (INHALATION) at 10:53

## 2020-01-27 RX ADMIN — ACETAMINOPHEN PRN MG: 325 TABLET ORAL at 22:18

## 2020-01-27 RX ADMIN — ATORVASTATIN CALCIUM SCH MG: 40 TABLET, FILM COATED ORAL at 10:42

## 2020-01-27 RX ADMIN — Medication SCH TAB: at 10:42

## 2020-01-27 RX ADMIN — INSULIN LISPRO SCH: 100 INJECTION, SOLUTION INTRAVENOUS; SUBCUTANEOUS at 16:35

## 2020-01-27 RX ADMIN — INSULIN GLARGINE SCH UNITS: 100 INJECTION, SOLUTION SUBCUTANEOUS at 17:32

## 2020-01-27 RX ADMIN — GABAPENTIN SCH MG: 400 CAPSULE ORAL at 20:36

## 2020-01-27 RX ADMIN — LEVOTHYROXINE SODIUM SCH MCG: 25 TABLET ORAL at 05:13

## 2020-01-27 RX ADMIN — OSELTAMIVIR PHOSPHATE SCH MG: 30 CAPSULE ORAL at 20:38

## 2020-01-27 RX ADMIN — TORSEMIDE SCH MG: 20 TABLET ORAL at 10:43

## 2020-01-27 RX ADMIN — INSULIN LISPRO SCH: 100 INJECTION, SOLUTION INTRAVENOUS; SUBCUTANEOUS at 07:40

## 2020-01-27 RX ADMIN — INSULIN LISPRO SCH UNIT: 100 INJECTION, SOLUTION INTRAVENOUS; SUBCUTANEOUS at 10:40

## 2020-01-27 RX ADMIN — INSULIN LISPRO SCH UNIT: 100 INJECTION, SOLUTION INTRAVENOUS; SUBCUTANEOUS at 13:03

## 2020-01-27 RX ADMIN — NITROFURANTOIN MACROCRYSTALS SCH MG: 50 CAPSULE ORAL at 20:35

## 2020-01-27 RX ADMIN — OSELTAMIVIR PHOSPHATE SCH MG: 30 CAPSULE ORAL at 10:41

## 2020-01-27 RX ADMIN — INSULIN GLARGINE SCH UNITS: 100 INJECTION, SOLUTION SUBCUTANEOUS at 05:16

## 2020-01-27 RX ADMIN — BISOPROLOL FUMARATE SCH MG: 5 TABLET ORAL at 10:41

## 2020-01-27 RX ADMIN — TRAZODONE HYDROCHLORIDE SCH MG: 50 TABLET ORAL at 20:37

## 2020-01-27 RX ADMIN — FLUOXETINE SCH MG: 20 CAPSULE ORAL at 10:43

## 2020-01-27 RX ADMIN — IPRATROPIUM BROMIDE AND ALBUTEROL SULFATE PRN NEB: .5; 3 SOLUTION RESPIRATORY (INHALATION) at 22:29

## 2020-01-27 RX ADMIN — BENZONATATE SCH MG: 100 CAPSULE ORAL at 15:22

## 2020-01-27 RX ADMIN — BENZONATATE SCH MG: 100 CAPSULE ORAL at 10:41

## 2020-01-27 NOTE — PN
Subjective


Date of Service: 01/27/20


Interval History: 





Patient is feeling better today. Patient is still only able to walk several 

feet before getting SOB. Patient is still bothered by a cough and some 

wheezing. Patient denies CP, dizziness, abdominal pain, diarrhea, constipation. 


Family History: Unchanged from Admission


Social History: Unchanged from Admission


Past Medical History: Unchanged from Admission





Objective


Active Medications: 








Acetaminophen (Tylenol Tab*)  650 mg PO Q6H PRN


   PRN Reason: MILD PAIN or TEMP > 100.4


   Last Admin: 01/26/20 22:40 Dose:  650 mg


Albuterol/Ipratropium (Duoneb (Albuterol 2.5 Mg/Ipratropium 0.5 Mg))  1 neb INH 

Q6H PRN


   PRN Reason: SOB/WHEEZING


   Last Admin: 01/27/20 10:53 Dose:  1 neb


Atorvastatin Calcium (Lipitor*)  40 mg PO DAILY [facility]


   Last Admin: 01/27/20 10:42 Dose:  40 mg


Benzonatate (Tessalon Cap*)  100 mg PO TID [facility]


   Last Admin: 01/27/20 10:41 Dose:  100 mg


Bisoprolol Fumarate (Zebeta Tab*)  10 mg PO DAILY [facility]


   Last Admin: 01/27/20 10:41 Dose:  10 mg


Dextrose (D50w Syringe 50 Ml*)  12.5 gm IV PUSH .FOR FS < 60 - SS PRN


   PRN Reason: FS < 60


Enoxaparin Sodium (Lovenox(*))  30 mg SUBCUT Q24H [facility]


   Last Admin: 01/26/20 17:23 Dose:  30 mg


Fluoxetine HCl (Prozac Cap*)  20 mg PO DAILY [facility]


   Last Admin: 01/27/20 10:43 Dose:  20 mg


Gabapentin (Neurontin Cap(*))  400 mg PO BEDTIME [facility]


   Last Admin: 01/26/20 20:33 Dose:  400 mg


Guaifenesin (Mucinex*)  1,200 mg PO BID [facility]


   Last Admin: 01/27/20 10:43 Dose:  1,200 mg


Levofloxacin/Dextrose (Levaquin 750 Mg Ivpremix(*))  750 mg in 150 mls @ 100 mls

/hr IVPB Q48H [facility]; Protocol


   Last Admin: 01/26/20 20:34 Dose:  100 mls/hr


Insulin Glargine (Lantus(*))  60 units SUBCUT Q12H [facility]


   Last Admin: 01/27/20 05:16 Dose:  60 units


Insulin Human Lispro (Humalog*)  0 units SUBCUT ACHS [facility]; Protocol


   Last Admin: 01/27/20 07:40 Dose:  Not Given


Insulin Human Lispro (Humalog*)  5 units SUBCUT AC [facility]


   Last Admin: 01/27/20 10:40 Dose:  5 unit


Lactobacillus Rhamnosus (Lactobacillus Acidophilus*)  1 tab PO DAILY [facility]


   Last Admin: 01/27/20 10:42 Dose:  1 tab


Levothyroxine Sodium (Synthroid Tab*)  25 mcg PO QAM@0600 [facility]


   Last Admin: 01/27/20 05:13 Dose:  25 mcg


Nitrofurantoin Macrocrystals (Macrodantin*)  50 mg PO BID [facility]


   Stop: 02/01/20 21:01


   Last Admin: 01/27/20 10:42 Dose:  50 mg


Nystatin (Nystatin Top Powder*)  1 applic TOPICAL TID PRN


   PRN Reason: RASH


Ondansetron HCl (Zofran Inj*)  4 mg IV Q6H PRN


   PRN Reason: NAUSEA


   Last Admin: 01/24/20 20:25 Dose:  4 mg


Oseltamivir Phosphate (Tamiflu Cap*)  30 mg PO BID [facility]


   Stop: 01/29/20 09:01


   Last Admin: 01/27/20 10:41 Dose:  30 mg


Spironolactone (Aldactone Tab*)  25 mg PO DAILY [facility]


   Last Admin: 01/27/20 10:42 Dose:  25 mg


Torsemide (Demadex*)  20 mg PO DAILY [facility]


   Last Admin: 01/27/20 10:43 Dose:  20 mg


Trazodone HCl (Desyrel Tab*)  50 mg PO BEDTIME [facility]


   Last Admin: 01/26/20 20:29 Dose:  50 mg








 Vital Signs - 8 hr











  01/27/20 01/27/20 01/27/20





  04:20 06:02 06:25


 


Temperature 97.4 F  


 


Pulse Rate 50 52 


 


Respiratory 24  





Rate   


 


Blood Pressure 96/40 133/62 133/62





(mmHg)   


 


O2 Sat by Pulse 98 100 100





Oximetry   














  01/27/20





  07:41


 


Temperature 97.3 F


 


Pulse Rate 51


 


Respiratory 20





Rate 


 


Blood Pressure 114/52





(mmHg) 


 


O2 Sat by Pulse 98





Oximetry 











Oxygen Devices in Use Now: None


Appearance: Patient is a 68yo female who appears stated age and is sitting in 

the bed in NAD.


Eyes: No Scleral Icterus, PERRLA


Ears/Nose/Mouth/Throat: NL Teeth, Lips, Gums, Clear Oropharnyx, Mucous 

Membranes Moist


Neck: NL Appearance and Movements; NL JVP, Trachea Midline


Respiratory: Symmetrical Chest Expansion and Respiratory Effort, - - Slight 

Expirtory wheezing in B/L [place]. 


Cardiovascular: NL Sounds; No Murmurs; No JVD, RRR, No Edema


Abdominal: NL Sounds; No Tenderness; No Distention, No Hepatosplenomegaly


Lymphatic: No Cervical Adenopathy


Extremities: No Edema, No Clubbing, Cyanosis


Skin: No Rash or Ulcers, No Nodules or Sclerosis


Neurological: Alert and Oriented x 3, NL Sensation, NL Muscle Strength and Tone

, - - CN II-XII intact. 


Result Diagrams: 


 01/27/20 06:52





 01/27/20 06:52


Microbiology and Other Data: 


 Microbiology











 01/24/20 20:20 Gram Stain - Final





 Sputum 


 


 01/24/20 20:20 Nasal Screen MRSA (PCR) - Final





 Nasal    Mrsa Not Detected


 


 01/24/20 20:20 Legionella Urinary Antigen - Final





 Urine    Negative Legionella Antigen





 Streptococcus pneumoniae Ag Screen - Final





    Negative S. pneumo Antigen














Assess/Plan/Problems-Billing


Assessment: 





Patient is a 68yo female with a PMH for COPD, DM II, CAD, Cirrhosis, CKD, here 

with Flu and likely bacterial superinfection who is improving on antibiotics 

and tamiflu. 





- Patient Problems


(1) Acute respiratory failure with hypoxia


Current Visit: Yes   Status: Acute   Code(s): J96.01 - ACUTE RESPIRATORY 

FAILURE WITH HYPOXIA   SNOMED Code(s): 16337805


   Comment: 


- Resolved


- Due to Flu and Likely bacterial Superinfection


- Treat with Tamiflu and Levaquin


- Possible Slight contributions from COPD and CHF exacerbations. Got 80mg IV 

Lasix in ED on 1/24


- Slight wheezing, continue PRN inhalers   





(2) Influenza A


Current Visit: Yes   Status: Acute   Code(s): J10.1 - FLU DUE TO OTH IDENT 

INFLUENZA VIRUS W OTH RESP MANIFEST   SNOMED Code(s): 033789831


   Comment: 


- Treat with Tamiflu as above.    





(3) Pneumonia


Current Visit: Yes   Status: Acute   Code(s): J18.9 - PNEUMONIA, UNSPECIFIED 

ORGANISM   SNOMED Code(s): 249752027


   Comment: 


- Likely bacterial superinfection on Flu with lobar infiltrates on CXR


- Treating with Levaquin due to antibiotic intolerances


- Sputum culture shows only normal jet and H. parainfluenza which is likely 

colonization.    





(4) CAD (coronary artery disease)


Current Visit: No   Status: Acute   Priority: Medium   Code(s): I25.10 - ATHSCL 

HEART DISEASE OF NATIVE CORONARY ARTERY W/O ANG PCTRS   SNOMED Code(s): 47040982


   Comment: 


- Continue statin, not on ASA or Plavix


- No signs of ACS.    





(5) Chronic diastolic (congestive) heart failure


Current Visit: No   Status: Acute   Code(s): I50.32 - CHRONIC DIASTOLIC (

CONGESTIVE) HEART FAILURE   SNOMED Code(s): 061593562


   Comment: 


- Mild exacerbation, possibly resolved


- Continue home diuretics.    





(6) History of cirrhosis of liver


Current Visit: No   Status: Chronic   Priority: Medium   Code(s): Z87.19 - 

PERSONAL HISTORY OF OTHER DISEASES OF THE DIGESTIVE SYSTEM   SNOMED Code(s): 

339116207


   Comment: 


- Due to Hep C


- States Hep C never treated, follow up outpatient.    





(7) Hyperlipemia


Current Visit: No   Status: Chronic   Priority: Low   Code(s): E78.5 - 

HYPERLIPIDEMIA, UNSPECIFIED   SNOMED Code(s): 71272263


   Comment: 


- Continue atorvastatin   





(8) Hypertension


Current Visit: No   Status: Chronic   Priority: Medium   Code(s): I10 - 

ESSENTIAL (PRIMARY) HYPERTENSION   SNOMED Code(s): 68971030


   Comment: 


- Borderline low BP


- Continue Bisoprolol, torsemide and spironolactone   





(9) COPD (chronic obstructive pulmonary disease)


Current Visit: No   Status: Chronic   Priority: Medium   Code(s): J44.9 - 

CHRONIC OBSTRUCTIVE PULMONARY DISEASE, UNSPECIFIED   SNOMED Code(s): 01530606


   Comment: 


- Mild Exacerbation due to flu. 


- 1 dose steroids on 1/24. 


- Continue home inhalers and PRN nebs. 


- Given mild nature, will not give steroids due to diabetes.    





(10) Obstructive sleep apnea


Current Visit: No   Status: Chronic   Priority: Medium   Code(s): G47.33 - 

OBSTRUCTIVE SLEEP APNEA (ADULT) (PEDIATRIC)   SNOMED Code(s): 61945882


   Comment: 


- Continue O2 overnight.    





(11) Bacteriuria


Current Visit: Yes   Status: Acute   Code(s): R82.71 - BACTERIURIA   SNOMED Code

(s): 28940015


   Comment: 


- >100,000 ESBL E. coli


- Minimal urinary symptoms (Come incontinence preior to admission)


- Treat with Macrobid at reduced dose due to renal dysfunction and resistance 

to Levaquin.    





(12) Type 2 diabetes mellitus


Current Visit: No   Status: Chronic   Priority: Medium   Comment: 


- Improving control 


- SSI and 60u Glargine BID, add on 5u insulin with meals. 


- Recently decreased lantus due to hypoglycemia at [place].    





(13) DVT prophylaxis


Current Visit: No   Status: Acute   Priority: Medium   Code(s): WPP1953 -    

SNOMED Code(s): 704263913


   Comment: 


- Lovenox   


Status and Disposition: 





Inpatient for Respiratory failure, D/C in next 1-2 days.

## 2020-01-28 LAB
ANION GAP SERPL CALC-SCNC: 5 MMOL/L (ref 2–11)
BASOPHILS # BLD AUTO: 0 10^3/UL (ref 0–0.2)
BUN SERPL-MCNC: 68 MG/DL (ref 6–24)
BUN/CREAT SERPL: 38.9 (ref 8–20)
CALCIUM SERPL-MCNC: 8.5 MG/DL (ref 8.6–10.3)
CHLORIDE SERPL-SCNC: 103 MMOL/L (ref 101–111)
EOSINOPHIL # BLD AUTO: 0.1 10^3/UL (ref 0–0.6)
GLUCOSE SERPL-MCNC: 86 MG/DL (ref 70–100)
HCO3 SERPL-SCNC: 26 MMOL/L (ref 22–32)
HCT VFR BLD AUTO: 29 % (ref 35–47)
HGB BLD-MCNC: 9.9 G/DL (ref 12–16)
LYMPHOCYTES # BLD AUTO: 1 10^3/UL (ref 1–4.8)
MAGNESIUM SERPL-MCNC: 2 MG/DL (ref 1.9–2.7)
MCH RBC QN AUTO: 29 PG (ref 27–31)
MCHC RBC AUTO-ENTMCNC: 34 G/DL (ref 31–36)
MCV RBC AUTO: 85 FL (ref 80–97)
MONOCYTES # BLD AUTO: 0.3 10^3/UL (ref 0–0.8)
NEUTROPHILS # BLD AUTO: 3.1 10^3/UL (ref 1.5–7.7)
NRBC # BLD AUTO: 0 10^3/UL
NRBC BLD QL AUTO: 0.1
PLATELET # BLD AUTO: 147 10^3/UL (ref 150–450)
POTASSIUM SERPL-SCNC: 4.6 MMOL/L (ref 3.5–5)
RBC # BLD AUTO: 3.41 10^6 /UL (ref 3.7–4.87)
SODIUM SERPL-SCNC: 134 MMOL/L (ref 135–145)
WBC # BLD AUTO: 4.5 10^3/UL (ref 3.5–10.8)

## 2020-01-28 RX ADMIN — SPIRONOLACTONE SCH: 25 TABLET ORAL at 11:39

## 2020-01-28 RX ADMIN — INSULIN GLARGINE SCH UNITS: 100 INJECTION, SOLUTION SUBCUTANEOUS at 18:04

## 2020-01-28 RX ADMIN — OSELTAMIVIR PHOSPHATE SCH MG: 30 CAPSULE ORAL at 21:30

## 2020-01-28 RX ADMIN — Medication SCH TAB: at 08:53

## 2020-01-28 RX ADMIN — BISOPROLOL FUMARATE SCH: 5 TABLET ORAL at 11:38

## 2020-01-28 RX ADMIN — ACETAMINOPHEN PRN MG: 325 TABLET ORAL at 22:40

## 2020-01-28 RX ADMIN — GABAPENTIN SCH MG: 400 CAPSULE ORAL at 21:30

## 2020-01-28 RX ADMIN — IPRATROPIUM BROMIDE AND ALBUTEROL SULFATE SCH NEB: .5; 3 SOLUTION RESPIRATORY (INHALATION) at 20:15

## 2020-01-28 RX ADMIN — INSULIN GLARGINE SCH UNITS: 100 INJECTION, SOLUTION SUBCUTANEOUS at 06:05

## 2020-01-28 RX ADMIN — TORSEMIDE SCH: 20 TABLET ORAL at 11:39

## 2020-01-28 RX ADMIN — FLUOXETINE SCH MG: 20 CAPSULE ORAL at 08:55

## 2020-01-28 RX ADMIN — TRAZODONE HYDROCHLORIDE SCH MG: 50 TABLET ORAL at 21:30

## 2020-01-28 RX ADMIN — GUAIFENESIN SCH MG: 600 TABLET, EXTENDED RELEASE ORAL at 08:54

## 2020-01-28 RX ADMIN — BENZONATATE SCH MG: 100 CAPSULE ORAL at 21:30

## 2020-01-28 RX ADMIN — INSULIN LISPRO SCH: 100 INJECTION, SOLUTION INTRAVENOUS; SUBCUTANEOUS at 07:35

## 2020-01-28 RX ADMIN — SALINE NASAL SPRAY SCH DROP: 1.5 SOLUTION NASAL at 18:41

## 2020-01-28 RX ADMIN — SALINE NASAL SPRAY SCH: 1.5 SOLUTION NASAL at 21:31

## 2020-01-28 RX ADMIN — ATORVASTATIN CALCIUM SCH MG: 40 TABLET, FILM COATED ORAL at 08:54

## 2020-01-28 RX ADMIN — INSULIN LISPRO SCH UNITS: 100 INJECTION, SOLUTION INTRAVENOUS; SUBCUTANEOUS at 21:30

## 2020-01-28 RX ADMIN — ENOXAPARIN SODIUM SCH MG: 30 INJECTION SUBCUTANEOUS at 18:04

## 2020-01-28 RX ADMIN — LEVOTHYROXINE SODIUM SCH MCG: 25 TABLET ORAL at 06:05

## 2020-01-28 RX ADMIN — INSULIN LISPRO SCH UNITS: 100 INJECTION, SOLUTION INTRAVENOUS; SUBCUTANEOUS at 14:36

## 2020-01-28 RX ADMIN — INSULIN LISPRO SCH UNITS: 100 INJECTION, SOLUTION INTRAVENOUS; SUBCUTANEOUS at 18:04

## 2020-01-28 RX ADMIN — OSELTAMIVIR PHOSPHATE SCH MG: 30 CAPSULE ORAL at 08:55

## 2020-01-28 RX ADMIN — ACETAMINOPHEN PRN MG: 325 TABLET ORAL at 14:37

## 2020-01-28 RX ADMIN — BENZONATATE SCH MG: 100 CAPSULE ORAL at 14:37

## 2020-01-28 RX ADMIN — BENZONATATE SCH MG: 100 CAPSULE ORAL at 08:54

## 2020-01-28 RX ADMIN — NITROFURANTOIN MACROCRYSTALS SCH: 50 CAPSULE ORAL at 09:34

## 2020-01-28 NOTE — PN
Subjective


Date of Service: 01/28/20


Interval History: 





Seen this am by Dr Medina. New onset cheek swelling with some tongue swelling 

overnight. ? Allergy to Macrobid and recd Solumedrol 40 mg and Benadryl 

overnight.On interview today, pt still c/o jaw pain,swelling,pain on swallowing 

and does not have a voice and whispering.


Family History: Unchanged from Admission


Social History: Unchanged from Admission


Past Medical History: Unchanged from Admission





Objective


Active Medications: 








Acetaminophen (Tylenol Tab*)  650 mg PO Q6H PRN


   PRN Reason: MILD PAIN or TEMP > 100.4


   Last Admin: 01/28/20 14:37 Dose:  650 mg


Albuterol/Ipratropium (Duoneb (Albuterol 2.5 Mg/Ipratropium 0.5 Mg))  1 neb INH 

Q6H PRN


   PRN Reason: SOB/WHEEZING


   Last Admin: 01/27/20 22:29 Dose:  1 neb


Atorvastatin Calcium (Lipitor*)  40 mg PO DAILY [facility]


   Last Admin: 01/28/20 08:54 Dose:  40 mg


Benzonatate (Tessalon Cap*)  100 mg PO TID [facility]


   Last Admin: 01/28/20 14:37 Dose:  100 mg


Bisoprolol Fumarate (Zebeta Tab*)  10 mg PO DAILY [facility]


   Last Admin: 01/28/20 11:38 Dose:  Not Given


Dextrose (D50w Syringe 50 Ml*)  12.5 gm IV PUSH .FOR FS < 60 - SS PRN


   PRN Reason: FS < 60


Enoxaparin Sodium (Lovenox(*))  30 mg SUBCUT Q24H [facility]


   Last Admin: 01/27/20 17:32 Dose:  30 mg


Epinephrine HCl (Epinephrine,Rac 2.25% Neb.Sol*)  0.5 ml INH Q4H [facility]


Fluoxetine HCl (Prozac Cap*)  20 mg PO DAILY [facility]


   Last Admin: 01/28/20 08:55 Dose:  20 mg


Gabapentin (Neurontin Cap(*))  400 mg PO BEDTIME [facility]


   Last Admin: 01/27/20 20:36 Dose:  400 mg


Insulin Glargine (Lantus(*))  60 units SUBCUT Q12H [facility]


   Last Admin: 01/28/20 06:05 Dose:  60 units


Insulin Human Lispro (Humalog*)  0 units SUBCUT ACHS [facility]; Protocol


   Last Admin: 01/28/20 14:36 Dose:  4 units


Lactobacillus Rhamnosus (Lactobacillus Acidophilus*)  1 tab PO DAILY [facility]


   Last Admin: 01/28/20 08:53 Dose:  1 tab


Levothyroxine Sodium (Synthroid Tab*)  25 mcg PO QAM@0600 [facility]


   Last Admin: 01/28/20 06:05 Dose:  25 mcg


Nystatin (Nystatin Top Powder*)  1 applic TOPICAL TID PRN


   PRN Reason: RASH


Ondansetron HCl (Zofran Inj*)  4 mg IV Q6H PRN


   PRN Reason: NAUSEA


   Last Admin: 01/24/20 20:25 Dose:  4 mg


Oseltamivir Phosphate (Tamiflu Cap*)  30 mg PO BID [facility]


   Stop: 01/29/20 09:01


   Last Admin: 01/28/20 08:55 Dose:  30 mg


Spironolactone (Aldactone Tab*)  25 mg PO DAILY [facility]


   Last Admin: 01/28/20 11:39 Dose:  Not Given


Torsemide (Demadex*)  20 mg PO DAILY [facility]


   Last Admin: 01/28/20 11:39 Dose:  Not Given


Trazodone HCl (Desyrel Tab*)  50 mg PO BEDTIME [facility]


   Last Admin: 01/27/20 20:37 Dose:  50 mg








 Vital Signs - 8 hr











  01/28/20 01/28/20 01/28/20





  09:34 11:13 13:34


 


Temperature  97.3 F 98.1 F


 


Pulse Rate  56 63


 


Respiratory  16 16





Rate   


 


Blood Pressure 100/48 113/51 120/51





(mmHg)   


 


O2 Sat by Pulse  100 99





Oximetry   














  01/28/20 01/28/20





  15:01 15:20


 


Temperature  98.7 F


 


Pulse Rate 64 78


 


Respiratory 18 20





Rate  


 


Blood Pressure  133/59





(mmHg)  


 


O2 Sat by Pulse 98 92





Oximetry  











Oxygen Devices in Use Now: Nasal Cannula


Eyes: No Scleral Icterus


Ears/Nose/Mouth/Throat: - - Mild lip swelling, L facial swelling, pain on 

opening jaw and goes into coughing fit. 


Respiratory: Symmetrical Chest Expansion and Respiratory Effort, Clear to 

Auscultation


Cardiovascular: NL Sounds; No Murmurs; No JVD


Abdominal: NL Sounds; No Tenderness; No Distention


Extremities: No Edema


Neurological: Alert and Oriented x 3, - - able to move bilateral UE and LE 3/5 

Strength bilaterally. Able to smile,raise brows. coughing fit on opening 

jaw.pain on palpation of L cheek with swelling.


Result Diagrams: 


 01/28/20 05:55





 01/28/20 05:55


Microbiology and Other Data: 


 Microbiology











 01/24/20 20:20 Gram Stain - Final





 Sputum 


 


 01/24/20 20:20 Nasal Screen MRSA (PCR) - Final





 Nasal    Mrsa Not Detected


 


 01/24/20 20:20 Legionella Urinary Antigen - Final





 Urine    Negative Legionella Antigen





 Streptococcus pneumoniae Ag Screen - Final





    Negative S. pneumo Antigen














Assess/Plan/Problems-Billing


Assessment: 





Patient is a 70yo female with a PMH for COPD, DM II, CAD, Cirrhosis, CKD, here 

with Flu and likely bacterial superinfection who is improving on antibiotics 

and tamiflu. 





- Patient Problems


(1) Anaphylactic reaction


Current Visit: Yes   Status: Acute   Code(s): T78.2XXA - ANAPHYLACTIC SHOCK, 

UNSPECIFIED, INITIAL ENCOUNTER   SNOMED Code(s): 75882739


   Comment: ?Anaphylactic reaction


Vitals stable


However no improvement 


Unable to talk with swelling


Recd steroids and benadryl at night


More sleepy today, unclear if from benadryl versus worsening mentation,word 

finding difficulty and slurring. Will get CT head to r/o Acute Stroke. Will 

give another dose of solumedrol 60 mg and start Racemic Epinephrine to see if 

there is any improvement in case of laryngeal edema


Discussed with pharmacy. New meds: Levaquin recd 26th Macrobid recd last night 

20;35. These symptoms were first noted around 2 am. Holding both meds for now 

till pic clear


Will discuss with ICU attending as pt may be better monitored in the ICU for 

24h till her symptoms improve in case of any airway compromise


Vitals currently stable. 92% on RA


Consider ENT evaluation and Imaging of Soft tissue of Neck and Face if no 

improvement after above


--Will also add ESR


-H/o Trigeminal Neuralgia


-Consider broad differential, TMJ


-Was on [facility] Valsartan per home med list but has not recd in the hospital





   





(2) Acute respiratory failure with hypoxia


Current Visit: Yes   Status: Acute   Code(s): J96.01 - ACUTE RESPIRATORY 

FAILURE WITH HYPOXIA   SNOMED Code(s): 19254754


   Comment: 


- Due to Flu and Likely bacterial Superinfection


- Treat with Tamiflu and Levaquin held now


- Possible Slight contributions from COPD and CHF exacerbations. Got 80mg IV 

Lasix in ED on 1/24


- Slight wheezing, continue PRN inhalers


   





(3) Bacteriuria


Current Visit: Yes   Status: Acute   Code(s): R82.71 - BACTERIURIA   SNOMED Code

(s): 57919755


   Comment: 


- >100,000 ESBL E. coli


- Minimal urinary symptoms (Come incontinence preior to admission)


- Was Treating with Macrobid at reduced dose due to renal dysfunction and 

resistance to Levaquin. 


-Hold both antibiotics sec above   





(4) Influenza A


Current Visit: Yes   Status: Acute   Code(s): J10.1 - FLU DUE TO OTH IDENT 

INFLUENZA VIRUS W OTH RESP MANIFEST   SNOMED Code(s): 005696021


   Comment: 


- Treat with Tamiflu as above.    





(5) Pneumonia


Current Visit: Yes   Status: Acute   Code(s): J18.9 - PNEUMONIA, UNSPECIFIED 

ORGANISM   SNOMED Code(s): 815509224


   Comment: 


- Likely bacterial superinfection on Flu with lobar infiltrates on CXR


- Treating with Levaquin due to antibiotic intolerances


- Sputum culture shows only normal jet and H. parainfluenza which is likely 

colonization.


-Antibiotics holding currently.?Reaction to meds    





(6) CAD (coronary artery disease)


Current Visit: No   Status: Acute   Priority: Medium   Code(s): I25.10 - ATHSCL 

HEART DISEASE OF NATIVE CORONARY ARTERY W/O ANG PCTRS   SNOMED Code(s): 82480687


   Comment: 


- Continue statin, not on ASA or Plavix


- No signs of ACS.    





(7) COPD (chronic obstructive pulmonary disease)


Current Visit: No   Status: Chronic   Priority: Medium   Code(s): J44.9 - 

CHRONIC OBSTRUCTIVE PULMONARY DISEASE, UNSPECIFIED   SNOMED Code(s): 36956292


   Comment: 


- Mild Exacerbation due to flu. 


- 1 dose steroids on 1/24. 


- Continue home inhalers and PRN nebs. 


-On steroids for above.


-Further plan per ICU   





(8) History of cirrhosis of liver


Current Visit: No   Status: Chronic   Priority: Medium   Code(s): Z87.19 - 

PERSONAL HISTORY OF OTHER DISEASES OF THE DIGESTIVE SYSTEM   SNOMED Code(s): 

115508700


   Comment: 


- Due to Hep C


- States Hep C never treated, follow up outpatient.    





(9) Hyperlipemia


Current Visit: No   Status: Chronic   Priority: Low   Code(s): E78.5 - 

HYPERLIPIDEMIA, UNSPECIFIED   SNOMED Code(s): 13617687


   Comment: 


- Continue atorvastatin   





(10) Hypertension


Current Visit: No   Status: Chronic   Priority: Medium   Code(s): I10 - 

ESSENTIAL (PRIMARY) HYPERTENSION   SNOMED Code(s): 82479845


   Comment: 


- Borderline low BP


- Continue Bisoprolol, torsemide and spironolactone


-All taking at home   





(11) Obstructive sleep apnea


Current Visit: No   Status: Chronic   Priority: Medium   Code(s): G47.33 - 

OBSTRUCTIVE SLEEP APNEA (ADULT) (PEDIATRIC)   SNOMED Code(s): 40271035


   Comment: 


- Continue O2 overnight.    


Status and Disposition: 





Transfer to ICU

## 2020-01-29 LAB
ANION GAP SERPL CALC-SCNC: 6 MMOL/L (ref 2–11)
BUN SERPL-MCNC: 47 MG/DL (ref 6–24)
BUN/CREAT SERPL: 34.6 (ref 8–20)
CALCIUM SERPL-MCNC: 8.9 MG/DL (ref 8.6–10.3)
CHLORIDE SERPL-SCNC: 101 MMOL/L (ref 101–111)
GLUCOSE SERPL-MCNC: 144 MG/DL (ref 70–100)
HCO3 SERPL-SCNC: 28 MMOL/L (ref 22–32)
POTASSIUM SERPL-SCNC: 4.5 MMOL/L (ref 3.5–5)
SODIUM SERPL-SCNC: 135 MMOL/L (ref 135–145)

## 2020-01-29 RX ADMIN — MEROPENEM AND SODIUM CHLORIDE SCH MLS/HR: 1 INJECTION, SOLUTION INTRAVENOUS at 23:44

## 2020-01-29 RX ADMIN — ENOXAPARIN SODIUM SCH MG: 30 INJECTION SUBCUTANEOUS at 18:13

## 2020-01-29 RX ADMIN — BENZONATATE SCH MG: 100 CAPSULE ORAL at 09:39

## 2020-01-29 RX ADMIN — BISOPROLOL FUMARATE SCH MG: 5 TABLET ORAL at 09:39

## 2020-01-29 RX ADMIN — BENZONATATE SCH MG: 100 CAPSULE ORAL at 14:21

## 2020-01-29 RX ADMIN — Medication SCH TAB: at 09:40

## 2020-01-29 RX ADMIN — FLUOXETINE SCH MG: 20 CAPSULE ORAL at 09:40

## 2020-01-29 RX ADMIN — IPRATROPIUM BROMIDE AND ALBUTEROL SULFATE SCH: .5; 3 SOLUTION RESPIRATORY (INHALATION) at 01:10

## 2020-01-29 RX ADMIN — ATORVASTATIN CALCIUM SCH MG: 40 TABLET, FILM COATED ORAL at 09:39

## 2020-01-29 RX ADMIN — SALINE NASAL SPRAY SCH DROP: 1.5 SOLUTION NASAL at 09:40

## 2020-01-29 RX ADMIN — INSULIN LISPRO SCH: 100 INJECTION, SOLUTION INTRAVENOUS; SUBCUTANEOUS at 16:56

## 2020-01-29 RX ADMIN — IPRATROPIUM BROMIDE AND ALBUTEROL SULFATE SCH NEB: .5; 3 SOLUTION RESPIRATORY (INHALATION) at 07:39

## 2020-01-29 RX ADMIN — Medication SCH ML: at 18:13

## 2020-01-29 RX ADMIN — LEVOTHYROXINE SODIUM SCH MCG: 25 TABLET ORAL at 06:08

## 2020-01-29 RX ADMIN — INSULIN GLARGINE SCH UNIT: 100 INJECTION, SOLUTION SUBCUTANEOUS at 18:13

## 2020-01-29 RX ADMIN — TORSEMIDE SCH MG: 20 TABLET ORAL at 09:39

## 2020-01-29 RX ADMIN — SALINE NASAL SPRAY SCH: 1.5 SOLUTION NASAL at 18:14

## 2020-01-29 RX ADMIN — SALINE NASAL SPRAY SCH: 1.5 SOLUTION NASAL at 02:20

## 2020-01-29 RX ADMIN — INSULIN LISPRO SCH UNITS: 100 INJECTION, SOLUTION INTRAVENOUS; SUBCUTANEOUS at 09:39

## 2020-01-29 RX ADMIN — SALINE NASAL SPRAY SCH DROP: 1.5 SOLUTION NASAL at 06:09

## 2020-01-29 RX ADMIN — ACETAMINOPHEN PRN MG: 325 TABLET ORAL at 20:56

## 2020-01-29 RX ADMIN — INSULIN LISPRO SCH UNITS: 100 INJECTION, SOLUTION INTRAVENOUS; SUBCUTANEOUS at 21:08

## 2020-01-29 RX ADMIN — SALINE NASAL SPRAY SCH: 1.5 SOLUTION NASAL at 23:55

## 2020-01-29 RX ADMIN — BENZONATATE SCH MG: 100 CAPSULE ORAL at 20:57

## 2020-01-29 RX ADMIN — GABAPENTIN SCH MG: 400 CAPSULE ORAL at 20:57

## 2020-01-29 RX ADMIN — INSULIN GLARGINE SCH UNITS: 100 INJECTION, SOLUTION SUBCUTANEOUS at 06:09

## 2020-01-29 RX ADMIN — SPIRONOLACTONE SCH MG: 25 TABLET ORAL at 09:40

## 2020-01-29 RX ADMIN — TRAZODONE HYDROCHLORIDE SCH MG: 50 TABLET ORAL at 20:57

## 2020-01-29 RX ADMIN — SALINE NASAL SPRAY SCH DROP: 1.5 SOLUTION NASAL at 14:21

## 2020-01-29 RX ADMIN — OSELTAMIVIR PHOSPHATE SCH MG: 30 CAPSULE ORAL at 09:40

## 2020-01-29 RX ADMIN — INSULIN LISPRO SCH UNITS: 100 INJECTION, SOLUTION INTRAVENOUS; SUBCUTANEOUS at 11:44

## 2020-01-29 NOTE — PN
Progress Note





- Progress Note


Date of Service: 20


Note: 





Progress Note -- Critical Care





24 hour events/significant events: 


- Patient was transferred to ICU yesterday after she had some right cheek/

facial swelling, concerning for allergic reaction. She was monitored overnight 

for airway protection


- Afebrile, vital signs stable


- Does have slight right cheek swelling and CT brain showed right mastoid 

effusion


- Patient feels fine and would like to get out of the ICU. She says she does 

not have any SOB, difficulty breathing, or difficulty swallowing. Tolerating PO 

diet





ROS: negative except for pertinent positives mentioned above





Tele: sinus bradycardia





Vitals: 


 Vital Signs











  20





  11:13 13:34 15:01


 


Temperature 97.3 F 98.1 F 


 


Pulse Rate 56 63 64


 


Respiratory 16 16 18





Rate   


 


Blood Pressure 113/51 120/51 





(mmHg)   


 


O2 Sat by Pulse 100 99 98





Oximetry   














  20





  15:20 16:40 17:00


 


Temperature 98.7 F 97.8 F 


 


Pulse Rate 78 67 73


 


Respiratory 20 20 17





Rate   


 


Blood Pressure 133/59 125/85 





(mmHg)   


 


O2 Sat by Pulse 92 97 97





Oximetry   














  20





  17:22 17:31 17:55


 


Temperature   


 


Pulse Rate 74 64 64


 


Respiratory 21 15 16





Rate   


 


Blood Pressure 144/70 129/69 161/72





(mmHg)   


 


O2 Sat by Pulse 97 97 97





Oximetry   














  20





  18:00 18:01 18:31


 


Temperature   


 


Pulse Rate 68 69 65


 


Respiratory 15 16 18





Rate   


 


Blood Pressure  141/62 139/86





(mmHg)   


 


O2 Sat by Pulse 96 95 98





Oximetry   














  20





  19:00 19:26 19:30


 


Temperature  98.3 F 


 


Pulse Rate 70  73


 


Respiratory 18  16





Rate   


 


Blood Pressure 148/79  137/81





(mmHg)   


 


O2 Sat by Pulse 99  98





Oximetry   














  20





  20:00 20:15 20:31


 


Temperature   


 


Pulse Rate 77 77 78


 


Respiratory 21 30 20





Rate   


 


Blood Pressure 146/83  147/66





(mmHg)   


 


O2 Sat by Pulse 97 100 96





Oximetry   














  20





  21:00 21:01 21:31


 


Temperature   


 


Pulse Rate 76 75 68


 


Respiratory 17 27 30





Rate   


 


Blood Pressure  137/80 142/65





(mmHg)   


 


O2 Sat by Pulse 96 96 99





Oximetry   














  20





  22:00 22:01 23:00


 


Temperature   


 


Pulse Rate 76 72 73


 


Respiratory 19 19 16





Rate   


 


Blood Pressure  106/77 123/57





(mmHg)   


 


O2 Sat by Pulse 99 100 99





Oximetry   














  20





  00:00 00:21 00:54


 


Temperature 98.1 F  


 


Pulse Rate 61 64 


 


Respiratory 17 23 16





Rate   


 


Blood Pressure 124/54  





(mmHg)   


 


O2 Sat by Pulse 100 100 





Oximetry   














  20





  01:00 01:01 02:00


 


Temperature   


 


Pulse Rate 61 58 56


 


Respiratory 16 16 17





Rate   


 


Blood Pressure  140/81 137/59





(mmHg)   


 


O2 Sat by Pulse 99 100 100





Oximetry   














  20





  03:00 03:01 03:40


 


Temperature   97.3 F


 


Pulse Rate 52 60 


 


Respiratory 16 16 





Rate   


 


Blood Pressure  92/86 





(mmHg)   


 


O2 Sat by Pulse 100 100 





Oximetry   














  20





  04:00 04:01 05:00


 


Temperature   


 


Pulse Rate 52 53 54


 


Respiratory 17 18 16





Rate   


 


Blood Pressure  139/53 





(mmHg)   


 


O2 Sat by Pulse 100 100 100





Oximetry   














  20





  06:00 06:08 07:00


 


Temperature   


 


Pulse Rate 50  


 


Respiratory 18 16 13





Rate   


 


Blood Pressure  136/55 





(mmHg)   


 


O2 Sat by Pulse 100 100 98





Oximetry   














  20





  07:01 07:41 08:00


 


Temperature   96.5 F


 


Pulse Rate  52 


 


Respiratory 16 20 32





Rate   


 


Blood Pressure 138/61  





(mmHg)   


 


O2 Sat by Pulse 100 100 98





Oximetry   














  20





  09:00 09:01


 


Temperature  


 


Pulse Rate  


 


Respiratory 13 13





Rate  


 


Blood Pressure  109/86





(mmHg)  


 


O2 Sat by Pulse 100 98





Oximetry  








Intake and Output Last 24 Hours











 20





 06:59 06:59 06:59 06:59


 


Intake Total 1600 1680 1760 


 


Output Total 1650 2150 1600 


 


Balance -50 -470 160 


 


Weight 188 lb 8 oz 183 lb 9.6 oz 176 lb 5.917 oz 


 


Intake:    


 


  IV Fluids 15   


 


    ABX - LEVOFLOXACIN 15   


 


  IVPB 150   


 


    ABX - LEVOFLOXACIN 150   


 


  Oral 1435 1680 1760 


 


Output:    


 


  Urine 1650 2150 1600 


 


Other:    


 


  Estimated Void  Medium Large 


 


  Date of Last Bowel   20 





  Movement    


 


  # Bowel Movements  1 0 


 


  Estimated Stool Amount  Small  


 


  # Voids 5 3 1 














O2: 2LNC





Infusions: None





Medications: 





Acetaminophen (Tylenol Tab*)  650 mg PO Q6H PRN


   PRN Reason: MILD PAIN or TEMP > 100.4


   Last Admin: 20 22:40 Dose:  650 mg


Albuterol/Ipratropium (Duoneb (Albuterol 2.5 Mg/Ipratropium 0.5 Mg))  1 neb INH 

[id number] AWAKE PRN


   PRN Reason: WHEEZING


Atorvastatin Calcium (Lipitor*)  40 mg PO DAILY [facility]


   Last Admin: 20 09:39 Dose:  40 mg


Benzonatate (Tessalon Cap*)  100 mg PO TID [facility]


   Last Admin: 20 09:39 Dose:  100 mg


Bisoprolol Fumarate (Zebeta Tab*)  10 mg PO DAILY [facility]


   Last Admin: 20 09:39 Dose:  10 mg


Dextrose (D50w Syringe 50 Ml*)  12.5 gm IV PUSH .FOR FS < 60 - SS PRN


   PRN Reason: FS < 60


Enoxaparin Sodium (Lovenox(*))  30 mg SUBCUT Q24H [facility]


   Last Admin: 20 18:04 Dose:  30 mg


Epinephrine HCl (Epinephrine,Rac 2.25% Neb.Sol*)  0.5 ml INH Q4H PRN


   PRN Reason: stridor


Fluoxetine HCl (Prozac Cap*)  20 mg PO DAILY [facility]


   Last Admin: 20 09:40 Dose:  20 mg


Gabapentin (Neurontin Cap(*))  400 mg PO BEDTIME [facility]


   Last Admin: 20 21:30 Dose:  400 mg


Meropenem (Merrem 500mg Premix(*))  500 mg in 50 mls @ 100 mls/hr IV Q6H [facility]; 

Protocol


   Stop: 20 08:59


Meropenem (Merrem 1 Gm Premix(*))  1 gm in 50 mls @ 100 mls/hr IV ONCE ONE


   Stop: 20 10:29


   Last Admin: 20 09:46 Dose:  100 mls/hr


Insulin Glargine (Lantus(*))  70 units SUBCUT Q12H [facility]


Insulin Human Lispro (Humalog*)  0 units SUBCUT ACHS [facility]; Protocol


   Last Admin: 20 09:39 Dose:  4 units


Lactobacillus Rhamnosus (Lactobacillus Acidophilus*)  1 tab PO DAILY [facility]


   Last Admin: 20 09:40 Dose:  1 tab


Levothyroxine Sodium (Synthroid Tab*)  25 mcg PO QAM@0600 [facility]


   Last Admin: 20 06:08 Dose:  25 mcg


Nystatin (Nystatin Top Powder*)  1 applic TOPICAL TID PRN


   PRN Reason: RASH


Ondansetron HCl (Zofran Inj*)  4 mg IV Q6H PRN


   PRN Reason: NAUSEA


   Last Admin: 20 20:25 Dose:  4 mg


Sodium Chloride (Sodium Chloride 0.65% Nasal Drops*)  1 drop BOTH NARES Q4H [facility]


   Stop: 20 17:59


   Last Admin: 20 09:40 Dose:  1 drop


Spironolactone (Aldactone Tab*)  25 mg PO DAILY [facility]


   Last Admin: 20 09:40 Dose:  25 mg


Torsemide (Demadex*)  20 mg PO DAILY [facility]


   Last Admin: 20 09:39 Dose:  20 mg


Trazodone HCl (Desyrel Tab*)  50 mg PO BEDTIME [facility]


   Last Admin: 20 21:30 Dose:  50 mg





Physical Exam:


Constitutional: awake, alert, no distress, no diaphoresis


Head: normocephalic, atraumatic


Eyes: no pallor, no icterus


ENT: moist mucous membranes. possible external right cheek swelling compared to 

left side. She does note some pain on palpation 


Neck: soft, supple, no jvd, no stridor


CVS: normal rate, regular, no murmur


Chest/Resp: bilateral air entry, no rhales, no wheeze, no rhonchi, no acc 

muscle use


Abdomen/GI: soft, nontender, nondistended, BS+


Ext/Msk: warm, pulses+, no edema


Skin: intact, warm


Neuro: awake, alert, orientedx3, moving all extremities, no gross focal deficit


Psych: normal affect





Labs: 


 Laboratory Results - last 24 hr











  20





  11:25 17:49 21:15


 


ESR   


 


POC Glucose (mg/dL)  225 H  292 H  393 H














  20





  16:36


 


ESR  50 H


 


POC Glucose (mg/dL) 











Imagin/28 CT brain: negative for acute abnormality, right mastoid effusion


 chest xray: resolving left upper lobe infiltrate





Assessment: 69F with known medical history developmental delays, heart failure 

with preserved EF, DM type 2, hepatitis C, CKD, COPD, HTN, HLD, PARVIZ presented 

initially on  with 4 days of SOB. SHe was found to have flu A and suspected 

PNA. She also had ESBL UTI. She was given levaquin and macrobid. on  @ 0200

, it was noted to have right facial swelling concerning for allergic reaction. 

She was given 2 doses of solumedrol and benadryl with little improvement. Was 

transferred to ICU on  for airway monitoring. : Patient feeling better, 

wants to leave the ICU. She is able to speak louder and clearer today. 


- Right mastoiditis


- PNA


- Influenza A


- ESBL





Plan:


Neuro-


- Neuro exam stable, no focal deficits


-Delirium prec; avoid BDZ





CVS-


-HTN: chronic, controlled with current regimen


-Maintain MAP>65 as long as SBP<150





Resp-


-Wean Fio2 to keep sat>92%


-Duonebs q6hr PRN, Aspiration prec, Pulmonary Toilet, IS





ID-


- Currently being treated for influenza A, suspected PNA, ESBL, and right 

mastoiditis. 


- Antibiotics were discontinued yesterday after concern for anaphylactic 

reaction. She has remained stable today without signs or symptoms


- Will start meropenum 1G now and then adjust dosing to updated kidney function


- Goal temp<101





GI-


-Nutrition: Patient had difficulty swallowing secretions last night but was 

able to tolerate full liquids. Switched to consistent carb diet this AM


-GI prophylaxis not indicated





Renal-


-Monitor I/O, replete to keep K>4, Mg>2





Heme-


- Continue lovenox for DVT prophylaxis





Endo-Maintain BG<200, BGs have been elevated


- Increased lantus to 70Units BID from 60Units BID


- No changes were made to rapid acting sliding scale





Musculsk- pressure ulcer prophylaxis. OOB


Wounds- none


Nutrition- Consistent carb


DVT prophylaxis: Lovenox


GI prophylaxis: not needed





Disposition:  Patient requires Critical Care/ICU for right mastoiditis. If 

patient tolerates meropenum without reaction, can be transferred to floor


Patient clinical status: stable


Code Status: Full

## 2020-01-30 LAB
ANION GAP SERPL CALC-SCNC: 4 MMOL/L (ref 2–11)
BASOPHILS # BLD AUTO: 0.1 10^3/UL (ref 0–0.2)
BUN SERPL-MCNC: 46 MG/DL (ref 6–24)
BUN/CREAT SERPL: 32.4 (ref 8–20)
CALCIUM SERPL-MCNC: 8.9 MG/DL (ref 8.6–10.3)
CHLORIDE SERPL-SCNC: 102 MMOL/L (ref 101–111)
EOSINOPHIL # BLD AUTO: 0.2 10^3/UL (ref 0–0.6)
GLUCOSE SERPL-MCNC: 98 MG/DL (ref 70–100)
HCO3 SERPL-SCNC: 30 MMOL/L (ref 22–32)
HCT VFR BLD AUTO: 33 % (ref 35–47)
HGB BLD-MCNC: 11.1 G/DL (ref 12–16)
LYMPHOCYTES # BLD AUTO: 1.4 10^3/UL (ref 1–4.8)
MCH RBC QN AUTO: 29 PG (ref 27–31)
MCHC RBC AUTO-ENTMCNC: 34 G/DL (ref 31–36)
MCV RBC AUTO: 86 FL (ref 80–97)
MONOCYTES # BLD AUTO: 0.5 10^3/UL (ref 0–0.8)
NEUTROPHILS # BLD AUTO: 5.6 10^3/UL (ref 1.5–7.7)
NRBC # BLD AUTO: 0 10^3/UL
NRBC BLD QL AUTO: 0
PLATELET # BLD AUTO: 204 10^3/UL (ref 150–450)
POTASSIUM SERPL-SCNC: 5.2 MMOL/L (ref 3.5–5)
RBC # BLD AUTO: 3.84 10^6 /UL (ref 3.7–4.87)
SODIUM SERPL-SCNC: 136 MMOL/L (ref 135–145)
WBC # BLD AUTO: 7.8 10^3/UL (ref 3.5–10.8)

## 2020-01-30 RX ADMIN — INSULIN GLARGINE SCH: 100 INJECTION, SOLUTION SUBCUTANEOUS at 09:12

## 2020-01-30 RX ADMIN — INSULIN GLARGINE SCH UNIT: 100 INJECTION, SOLUTION SUBCUTANEOUS at 18:18

## 2020-01-30 RX ADMIN — BENZONATATE SCH MG: 100 CAPSULE ORAL at 21:10

## 2020-01-30 RX ADMIN — SPIRONOLACTONE SCH MG: 25 TABLET ORAL at 09:17

## 2020-01-30 RX ADMIN — FLUOXETINE SCH MG: 20 CAPSULE ORAL at 09:17

## 2020-01-30 RX ADMIN — ACETAMINOPHEN PRN MG: 325 TABLET ORAL at 10:29

## 2020-01-30 RX ADMIN — Medication SCH ML: at 18:18

## 2020-01-30 RX ADMIN — BENZONATATE SCH MG: 100 CAPSULE ORAL at 12:54

## 2020-01-30 RX ADMIN — ENOXAPARIN SODIUM SCH MG: 30 INJECTION SUBCUTANEOUS at 18:18

## 2020-01-30 RX ADMIN — MEROPENEM AND SODIUM CHLORIDE SCH MLS/HR: 1 INJECTION, SOLUTION INTRAVENOUS at 09:17

## 2020-01-30 RX ADMIN — TORSEMIDE SCH MG: 20 TABLET ORAL at 09:17

## 2020-01-30 RX ADMIN — MEROPENEM AND SODIUM CHLORIDE SCH MLS/HR: 1 INJECTION, SOLUTION INTRAVENOUS at 21:12

## 2020-01-30 RX ADMIN — SALINE NASAL SPRAY SCH: 1.5 SOLUTION NASAL at 09:12

## 2020-01-30 RX ADMIN — SALINE NASAL SPRAY SCH: 1.5 SOLUTION NASAL at 18:19

## 2020-01-30 RX ADMIN — SALINE NASAL SPRAY SCH: 1.5 SOLUTION NASAL at 12:55

## 2020-01-30 RX ADMIN — BENZONATATE SCH MG: 100 CAPSULE ORAL at 09:18

## 2020-01-30 RX ADMIN — TRAZODONE HYDROCHLORIDE SCH MG: 50 TABLET ORAL at 21:10

## 2020-01-30 RX ADMIN — SALINE NASAL SPRAY SCH: 1.5 SOLUTION NASAL at 09:18

## 2020-01-30 RX ADMIN — Medication SCH TAB: at 09:17

## 2020-01-30 RX ADMIN — INSULIN LISPRO SCH UNITS: 100 INJECTION, SOLUTION INTRAVENOUS; SUBCUTANEOUS at 12:54

## 2020-01-30 RX ADMIN — INSULIN LISPRO SCH: 100 INJECTION, SOLUTION INTRAVENOUS; SUBCUTANEOUS at 09:12

## 2020-01-30 RX ADMIN — SALINE NASAL SPRAY SCH: 1.5 SOLUTION NASAL at 21:02

## 2020-01-30 RX ADMIN — Medication SCH ML: at 22:06

## 2020-01-30 RX ADMIN — SALINE NASAL SPRAY SCH: 1.5 SOLUTION NASAL at 02:42

## 2020-01-30 RX ADMIN — ATORVASTATIN CALCIUM SCH MG: 40 TABLET, FILM COATED ORAL at 09:17

## 2020-01-30 RX ADMIN — Medication SCH ML: at 05:58

## 2020-01-30 RX ADMIN — BISOPROLOL FUMARATE SCH: 5 TABLET ORAL at 16:04

## 2020-01-30 RX ADMIN — LEVOTHYROXINE SODIUM SCH MCG: 25 TABLET ORAL at 05:58

## 2020-01-30 RX ADMIN — GABAPENTIN SCH MG: 400 CAPSULE ORAL at 21:09

## 2020-01-30 RX ADMIN — INSULIN LISPRO SCH: 100 INJECTION, SOLUTION INTRAVENOUS; SUBCUTANEOUS at 17:23

## 2020-01-30 RX ADMIN — INSULIN LISPRO SCH UNITS: 100 INJECTION, SOLUTION INTRAVENOUS; SUBCUTANEOUS at 21:11
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
## 2020-01-30 NOTE — PN
Subjective


Date of Service: 01/30/20


Interval History: 








Resting in bed on assessment. Reports she is fatigued. Reports right ear pain. 

Denies sob, cp, fever, chills, nausea, vomiting, diarrhea. 


Family History: Unchanged from Admission


Social History: Unchanged from Admission


Past Medical History: Unchanged from Admission





Objective


Active Medications: 








Acetaminophen (Tylenol Tab*)  650 mg PO Q6H PRN


   PRN Reason: MILD PAIN or TEMP > 100.4


   Last Admin: 01/30/20 10:29 Dose:  650 mg


Albuterol/Ipratropium (Duoneb (Albuterol 2.5 Mg/Ipratropium 0.5 Mg))  1 neb INH 

[id number] AWAKE PRN


   PRN Reason: WHEEZING


Atorvastatin Calcium (Lipitor*)  40 mg PO DAILY [facility]


   Last Admin: 01/30/20 09:17 Dose:  40 mg


Benzonatate (Tessalon Cap*)  100 mg PO TID [facility]


   Last Admin: 01/30/20 12:54 Dose:  100 mg


Bisoprolol Fumarate (Zebeta Tab*)  10 mg PO DAILY [facility]


   Last Admin: 01/30/20 16:04 Dose:  Not Given


Dextrose (D50w Syringe 50 Ml*)  12.5 gm IV PUSH .FOR FS < 60 - SS PRN


   PRN Reason: FS < 60


Enoxaparin Sodium (Lovenox(*))  30 mg SUBCUT Q24H [facility]


   Last Admin: 01/29/20 18:13 Dose:  30 mg


Epinephrine HCl (Epinephrine,Rac 2.25% Neb.Sol*)  0.5 ml INH Q4H PRN


   PRN Reason: stridor


Fluoxetine HCl (Prozac Cap*)  20 mg PO DAILY [facility]


   Last Admin: 01/30/20 09:17 Dose:  20 mg


Gabapentin (Neurontin Cap(*))  400 mg PO BEDTIME [facility]


   Last Admin: 01/29/20 20:57 Dose:  400 mg


Heparin Sodium (Porcine) (Heparin Flush Picc/Ml/Cvc(*))  1 - 3 ml FLUSH 0600,

1800 [facility]; Protocol


   Last Admin: 01/30/20 05:58 Dose:  1 ml


Meropenem (Merrem 1 Gm Premix(*))  1 gm in 50 mls @ 100 mls/hr IV Q12H [facility]


   Last Admin: 01/30/20 09:17 Dose:  100 mls/hr


Insulin Glargine (Lantus(*))  60 units SUBCUT Q12H [facility]


   Last Admin: 01/30/20 09:12 Dose:  Not Given


Insulin Human Lispro (Humalog*)  0 units SUBCUT ACHS [facility]; Protocol


   Last Admin: 01/30/20 12:54 Dose:  2 units


Lactobacillus Rhamnosus (Lactobacillus Acidophilus*)  1 tab PO DAILY [facility]


   Last Admin: 01/30/20 09:17 Dose:  1 tab


Levothyroxine Sodium (Synthroid Tab*)  25 mcg PO QAM@0600 [facility]


   Last Admin: 01/30/20 05:58 Dose:  25 mcg


Nystatin (Nystatin Top Powder*)  1 applic TOPICAL TID PRN


   PRN Reason: RASH


Ondansetron HCl (Zofran Inj*)  4 mg IV Q6H PRN


   PRN Reason: NAUSEA


   Last Admin: 01/24/20 20:25 Dose:  4 mg


Sodium Chloride (Sodium Chloride 0.65% Nasal Drops*)  1 drop BOTH NARES Q4H [facility]


   Stop: 02/02/20 17:59


   Last Admin: 01/30/20 12:55 Dose:  Not Given


Spironolactone (Aldactone Tab*)  25 mg PO DAILY [facility]


   Last Admin: 01/30/20 09:17 Dose:  25 mg


Torsemide (Demadex*)  20 mg PO DAILY [facility]


   Last Admin: 01/30/20 09:17 Dose:  20 mg


Trazodone HCl (Desyrel Tab*)  50 mg PO BEDTIME [facility]


   Last Admin: 01/29/20 20:57 Dose:  50 mg








 Vital Signs - 8 hr











  01/30/20 01/30/20 01/30/20





  08:31 11:57 15:44


 


Temperature 97.2 F 97.5 F 97.3 F


 


Pulse Rate 43 51 53


 


Respiratory 16 21 18





Rate   


 


Blood Pressure 138/51 141/52 138/48





(mmHg)   


 


O2 Sat by Pulse 100 100 98





Oximetry   











Oxygen Devices in Use Now: None


Appearance: Comfortable, NAD


Eyes: No Scleral Icterus


Ears/Nose/Mouth/Throat: Clear Oropharnyx, Mucous Membranes Moist


Neck: NL Appearance and Movements; NL JVP


Respiratory: Symmetrical Chest Expansion and Respiratory Effort, Clear to 

Auscultation


Cardiovascular: NL Sounds; No Murmurs; No JVD, RRR, No Edema


Abdominal: NL Sounds; No Tenderness; No Distention


Lymphatic: No Cervical Adenopathy


Extremities: No Edema


Skin: No Rash or Ulcers


Neurological: Alert and Oriented x 3, NL Muscle Strength and Tone


Nutrition: Taking PO's


Result Diagrams: 


 01/28/20 05:55





 01/29/20 14:30


Additional Lab and Data: 





 Laboratory Results - last 24 hr











  01/29/20 01/29/20 01/30/20





  16:51 20:40 05:53


 


POC Glucose (mg/dL)  102 H  224 H  64 L














  01/30/20 01/30/20





  08:59 12:13


 


POC Glucose (mg/dL)  72  161 H











Microbiology and Other Data: 








 Microbiology





01/24/20 16:11   Blood Venous   Aerobic Blood Culture - Final


                            No Growth Day 5


01/24/20 16:11   Blood Venous   Anaerobic Blood Culture - Final


                            No Growth Day 5


01/24/20 15:24   Blood Venous   Aerobic Blood Culture - Final


                            No Growth Day 5


01/24/20 15:24   Blood Venous   Anaerobic Blood Culture - Final


                            No Growth Day 5


01/24/20 20:20   Sputum   Gram Stain - Final


01/24/20 20:20   Sputum   Sputum Culture - Final


                            Haemophilus Parainfluenzae


                            Normal Sunitha


01/24/20 15:02   Urine   Urine Culture - Final


                            Esbl Escherichia Coli


01/24/20 20:20   Nasal   Nasal Screen MRSA (PCR) - Final


                            Mrsa Not Detected


01/24/20 20:20   Urine   Legionella Urinary Antigen - Final


                            Negative Legionella Antigen


01/24/20 20:20   Urine   Streptococcus pneumoniae Ag Screen - Final


                            Negative S. pneumo Antigen











Assess/Plan/Problems-Billing


Assessment: 





Patient is a 68yo female with a PMH for COPD, DM II, CAD, Cirrhosis, CKD, here 

with Flu and likely bacterial superinfection who is improving on antibiotics 

and tamiflu. 





- Patient Problems


(1) Right ear pain


Comment: 


- CT revealed possible right mastiod effusion. 


- No tenderness to palpation of mastoid bone


- Tenderness to palpation of right ear and tragus. 


- ID following and I appreciate their assistance   





(2) Acute respiratory failure with hypoxia


Comment: 


- Flu A and PNA


- Treat with Tamiflu 


- Meropenum x 24 more hrs per ID   





(3) Bacteriuria


Comment: 


- Meropenom


- >100,000 ESBL E. coli   





(4) CAD (coronary artery disease)


Comment: 


- Continue statin, not on ASA or Plavix


- No signs of ACS.    





(5) Chronic diastolic (congestive) heart failure


Comment: 


- Cont home diurectics 


- Daily weights and I&Os   





(6) COPD (chronic obstructive pulmonary disease)


Comment: 


- Mild Exacerbation due to flu.  


- Continue home inhalers and PRN nebs.    





(7) Hyperlipemia


Comment: 


- Continue atorvastatin   





(8) Hypertension


Comment: 


- Normotensive


- Continue Bisoprolol, torsemide and spironolactone


- Held bisoprolol today due to bradycardia. Reasess tomorrow   





(9) Obstructive sleep apnea


Comment: 


- Continue O2 overnight.    





(10) Type 2 diabetes mellitus


Current Visit: No   Status: Chronic   Priority: Medium   Comment: 


- Improving control 


- SSI and 60u Glargine BID, add on 5u insulin with meals. 


- Recently decreased lantus due to hypoglycemia at [place].    





(11) DVT prophylaxis


Comment: 


- Lovenox   


Status and Disposition: 





Discharge when medically stable


Attending: Rojas Cox

## 2020-01-31 LAB
ANION GAP SERPL CALC-SCNC: 4 MMOL/L (ref 2–11)
BASOPHILS # BLD AUTO: 0 10^3/UL (ref 0–0.2)
BUN SERPL-MCNC: 40 MG/DL (ref 6–24)
BUN/CREAT SERPL: 31.5 (ref 8–20)
CALCIUM SERPL-MCNC: 8.5 MG/DL (ref 8.6–10.3)
CHLORIDE SERPL-SCNC: 105 MMOL/L (ref 101–111)
EOSINOPHIL # BLD AUTO: 0.1 10^3/UL (ref 0–0.6)
GLUCOSE SERPL-MCNC: 64 MG/DL (ref 70–100)
HCO3 SERPL-SCNC: 28 MMOL/L (ref 22–32)
HCT VFR BLD AUTO: 29 % (ref 35–47)
HGB BLD-MCNC: 9.8 G/DL (ref 12–16)
LYMPHOCYTES # BLD AUTO: 1.1 10^3/UL (ref 1–4.8)
MAGNESIUM SERPL-MCNC: 2 MG/DL (ref 1.9–2.7)
MCH RBC QN AUTO: 29 PG (ref 27–31)
MCHC RBC AUTO-ENTMCNC: 34 G/DL (ref 31–36)
MCV RBC AUTO: 85 FL (ref 80–97)
MONOCYTES # BLD AUTO: 0.3 10^3/UL (ref 0–0.8)
NEUTROPHILS # BLD AUTO: 2.3 10^3/UL (ref 1.5–7.7)
NRBC # BLD AUTO: 0 10^3/UL
NRBC BLD QL AUTO: 0.1
PLATELET # BLD AUTO: 139 10^3/UL (ref 150–450)
POTASSIUM SERPL-SCNC: 5 MMOL/L (ref 3.5–5)
RBC # BLD AUTO: 3.4 10^6 /UL (ref 3.7–4.87)
SODIUM SERPL-SCNC: 137 MMOL/L (ref 135–145)
TSH SERPL-ACNC: 2.09 MCIU/ML (ref 0.34–5.6)
WBC # BLD AUTO: 3.9 10^3/UL (ref 3.5–10.8)

## 2020-01-31 RX ADMIN — SALINE NASAL SPRAY SCH: 1.5 SOLUTION NASAL at 05:31

## 2020-01-31 RX ADMIN — BENZONATATE SCH MG: 100 CAPSULE ORAL at 12:35

## 2020-01-31 RX ADMIN — SALINE NASAL SPRAY SCH DROP: 1.5 SOLUTION NASAL at 09:56

## 2020-01-31 RX ADMIN — ACETAMINOPHEN PRN MG: 325 TABLET ORAL at 22:02

## 2020-01-31 RX ADMIN — INSULIN LISPRO SCH UNITS: 100 INJECTION, SOLUTION INTRAVENOUS; SUBCUTANEOUS at 17:21

## 2020-01-31 RX ADMIN — ATORVASTATIN CALCIUM SCH MG: 40 TABLET, FILM COATED ORAL at 09:55

## 2020-01-31 RX ADMIN — TORSEMIDE SCH MG: 20 TABLET ORAL at 09:55

## 2020-01-31 RX ADMIN — INSULIN LISPRO SCH UNITS: 100 INJECTION, SOLUTION INTRAVENOUS; SUBCUTANEOUS at 12:35

## 2020-01-31 RX ADMIN — FLUOXETINE SCH MG: 20 CAPSULE ORAL at 09:55

## 2020-01-31 RX ADMIN — MEROPENEM AND SODIUM CHLORIDE SCH MLS/HR: 1 INJECTION, SOLUTION INTRAVENOUS at 09:54

## 2020-01-31 RX ADMIN — LEVOTHYROXINE SODIUM SCH MCG: 25 TABLET ORAL at 05:37

## 2020-01-31 RX ADMIN — INSULIN GLARGINE SCH UNIT: 100 INJECTION, SOLUTION SUBCUTANEOUS at 05:37

## 2020-01-31 RX ADMIN — Medication SCH TAB: at 09:55

## 2020-01-31 RX ADMIN — BENZONATATE SCH MG: 100 CAPSULE ORAL at 09:55

## 2020-01-31 RX ADMIN — SPIRONOLACTONE SCH MG: 25 TABLET ORAL at 09:55

## 2020-01-31 RX ADMIN — BISOPROLOL FUMARATE SCH: 5 TABLET ORAL at 09:43

## 2020-01-31 RX ADMIN — BENZONATATE SCH MG: 100 CAPSULE ORAL at 22:01

## 2020-01-31 RX ADMIN — SALINE NASAL SPRAY SCH: 1.5 SOLUTION NASAL at 22:46

## 2020-01-31 RX ADMIN — SALINE NASAL SPRAY SCH: 1.5 SOLUTION NASAL at 17:11

## 2020-01-31 RX ADMIN — SALINE NASAL SPRAY SCH: 1.5 SOLUTION NASAL at 01:27

## 2020-01-31 RX ADMIN — INSULIN LISPRO SCH: 100 INJECTION, SOLUTION INTRAVENOUS; SUBCUTANEOUS at 07:38

## 2020-01-31 RX ADMIN — Medication SCH ML: at 17:22

## 2020-01-31 RX ADMIN — TRAZODONE HYDROCHLORIDE SCH MG: 50 TABLET ORAL at 22:02

## 2020-01-31 RX ADMIN — MEROPENEM AND SODIUM CHLORIDE SCH MLS/HR: 1 INJECTION, SOLUTION INTRAVENOUS at 21:56

## 2020-01-31 RX ADMIN — INSULIN LISPRO SCH UNITS: 100 INJECTION, SOLUTION INTRAVENOUS; SUBCUTANEOUS at 22:09

## 2020-01-31 RX ADMIN — SALINE NASAL SPRAY SCH: 1.5 SOLUTION NASAL at 12:36

## 2020-01-31 RX ADMIN — GABAPENTIN SCH MG: 400 CAPSULE ORAL at 22:01

## 2020-01-31 RX ADMIN — Medication SCH ML: at 05:30

## 2020-01-31 RX ADMIN — ACETAMINOPHEN PRN MG: 325 TABLET ORAL at 09:54

## 2020-01-31 RX ADMIN — ENOXAPARIN SODIUM SCH MG: 30 INJECTION SUBCUTANEOUS at 17:21

## 2020-01-31 NOTE — PN
Subjective


Date of Service: 01/31/20


Interval History: 





Sitting in chair on assessment. Patient on RA. Reports occasional cough. 

Reports right ear pain is improving. Denies sob, cp, nausea, vomiting, 

diarrhea. 


Family History: Unchanged from Admission


Social History: Unchanged from Admission


Past Medical History: Unchanged from Admission





Objective


Active Medications: 








Acetaminophen (Tylenol Tab*)  650 mg PO Q6H PRN


   PRN Reason: MILD PAIN or TEMP > 100.4


   Last Admin: 01/31/20 09:54 Dose:  650 mg


Albuterol/Ipratropium (Duoneb (Albuterol 2.5 Mg/Ipratropium 0.5 Mg))  1 neb INH 

[id number] AWAKE PRN


   PRN Reason: WHEEZING


Atorvastatin Calcium (Lipitor*)  40 mg PO DAILY [facility]


   Last Admin: 01/31/20 09:55 Dose:  40 mg


Benzonatate (Tessalon Cap*)  100 mg PO TID [facility]


   Last Admin: 01/31/20 12:35 Dose:  100 mg


Dextrose (D50w Syringe 50 Ml*)  12.5 gm IV PUSH .FOR FS < 60 - SS PRN


   PRN Reason: FS < 60


Enoxaparin Sodium (Lovenox(*))  30 mg SUBCUT Q24H [facility]


   Last Admin: 01/30/20 18:18 Dose:  30 mg


Epinephrine HCl (Epinephrine,Rac 2.25% Neb.Sol*)  0.5 ml INH Q4H PRN


   PRN Reason: stridor


Fluoxetine HCl (Prozac Cap*)  20 mg PO DAILY [facility]


   Last Admin: 01/31/20 09:55 Dose:  20 mg


Gabapentin (Neurontin Cap(*))  400 mg PO BEDTIME [facility]


   Last Admin: 01/30/20 21:09 Dose:  400 mg


Heparin Sodium (Porcine) (Heparin Flush Picc/Ml/Cvc(*))  1 - 3 ml FLUSH 0600,

1800 [facility]; Protocol


   Last Admin: 01/31/20 05:30 Dose:  1 ml


Meropenem (Merrem 1 Gm Premix(*))  1 gm in 50 mls @ 100 mls/hr IV Q12H [facility]


   Last Admin: 01/31/20 09:54 Dose:  100 mls/hr


Insulin Glargine (Lantus(*))  40 units SUBCUT DAILY [facility]


Insulin Human Lispro (Humalog*)  0 units SUBCUT ACHS [facility]; Protocol


   Last Admin: 01/31/20 12:35 Dose:  1 units


Lactobacillus Rhamnosus (Lactobacillus Acidophilus*)  1 tab PO DAILY [facility]


   Last Admin: 01/31/20 09:55 Dose:  1 tab


Levothyroxine Sodium (Synthroid Tab*)  25 mcg PO QAM@0600 [facility]


   Last Admin: 01/31/20 05:37 Dose:  25 mcg


Nystatin (Nystatin Top Powder*)  1 applic TOPICAL TID PRN


   PRN Reason: RASH


Ondansetron HCl (Zofran Inj*)  4 mg IV Q6H PRN


   PRN Reason: NAUSEA


   Last Admin: 01/24/20 20:25 Dose:  4 mg


Sodium Chloride (Sodium Chloride 0.65% Nasal Drops*)  1 drop BOTH NARES Q4H [facility]


   Stop: 02/02/20 17:59


   Last Admin: 01/31/20 12:36 Dose:  Not Given


Spironolactone (Aldactone Tab*)  25 mg PO DAILY [facility]


   Last Admin: 01/31/20 09:55 Dose:  25 mg


Torsemide (Demadex*)  20 mg PO DAILY [facility]


   Last Admin: 01/31/20 09:55 Dose:  20 mg


Trazodone HCl (Desyrel Tab*)  50 mg PO BEDTIME [facility]


   Last Admin: 01/30/20 21:10 Dose:  50 mg


Valsartan (Diovan Tab*)  80 mg PO DAILY [facility]








 Vital Signs - 8 hr











  01/31/20 01/31/20 01/31/20





  07:39 07:40 08:00


 


Temperature 97.2 F  


 


Pulse Rate 48  


 


Respiratory 16  16





Rate   


 


Blood Pressure 145/62  





(mmHg)   


 


O2 Sat by Pulse 100 100 100





Oximetry   














  01/31/20





  12:00


 


Temperature 97.5 F


 


Pulse Rate 53


 


Respiratory 16





Rate 


 


Blood Pressure 138/50





(mmHg) 


 


O2 Sat by Pulse 99





Oximetry 











Oxygen Devices in Use Now: None


Appearance: Comfortable, NAD


Eyes: No Scleral Icterus


Ears/Nose/Mouth/Throat: Mucous Membranes Moist


Neck: NL Appearance and Movements; NL JVP


Respiratory: Symmetrical Chest Expansion and Respiratory Effort, Clear to 

Auscultation


Cardiovascular: NL Sounds; No Murmurs; No JVD, RRR, No Edema


Abdominal: NL Sounds; No Tenderness; No Distention


Lymphatic: No Cervical Adenopathy


Extremities: No Edema


Skin: No Rash or Ulcers


Neurological: Alert and Oriented x 3


Nutrition: Taking PO's


Result Diagrams: 


 01/31/20 06:17





 01/31/20 06:17


Additional Lab and Data: 





 Laboratory Results - last 24 hr











  01/30/20 01/30/20 01/30/20





  17:04 17:04 17:18


 


WBC  7.8  


 


RBC  3.84  


 


Hgb  11.1 L  


 


Hct  33 L  


 


MCV  86  


 


MCH  29  


 


MCHC  34  


 


RDW  14  


 


Plt Count  204  


 


MPV  7.5  


 


Neut % (Auto)  72.4  


 


Lymph % (Auto)  18.3  


 


Mono % (Auto)  6.2  


 


Eos % (Auto)  2.4  


 


Baso % (Auto)  0.7  


 


Absolute Neuts (auto)  5.6  


 


Absolute Lymphs (auto)  1.4  


 


Absolute Monos (auto)  0.5  


 


Absolute Eos (auto)  0.2  


 


Absolute Basos (auto)  0.1  


 


Absolute Nucleated RBC  0.0  


 


Nucleated RBC %  0.0  


 


Sodium   136 


 


Potassium   5.2 H 


 


Chloride   102 


 


Carbon Dioxide   30 


 


Anion Gap   4 


 


BUN   46 H 


 


Creatinine   1.42 H 


 


Est GFR ( Amer)   44.1 


 


Est GFR (Non-Af Amer)   36.5 


 


BUN/Creatinine Ratio   32.4 H 


 


Glucose   98 


 


POC Glucose (mg/dL)    112 H


 


Calcium   8.9 


 


Magnesium   


 


TSH   














  01/30/20 01/31/20 01/31/20





  20:59 05:04 06:17


 


WBC    3.9


 


RBC    3.40 L


 


Hgb    9.8 L


 


Hct    29 L


 


MCV    85


 


MCH    29


 


MCHC    34


 


RDW    14


 


Plt Count    139 L


 


MPV    7.7


 


Neut % (Auto)    60.4


 


Lymph % (Auto)    28.3


 


Mono % (Auto)    8.2


 


Eos % (Auto)    2.6


 


Baso % (Auto)    0.5


 


Absolute Neuts (auto)    2.3


 


Absolute Lymphs (auto)    1.1


 


Absolute Monos (auto)    0.3


 


Absolute Eos (auto)    0.1


 


Absolute Basos (auto)    0.0


 


Absolute Nucleated RBC    0.0


 


Nucleated RBC %    0.1


 


Sodium   


 


Potassium   


 


Chloride   


 


Carbon Dioxide   


 


Anion Gap   


 


BUN   


 


Creatinine   


 


Est GFR ( Amer)   


 


Est GFR (Non-Af Amer)   


 


BUN/Creatinine Ratio   


 


Glucose   


 


POC Glucose (mg/dL)  255 H  84 


 


Calcium   


 


Magnesium   


 


TSH   














  01/31/20 01/31/20 01/31/20





  06:17 07:28 07:50


 


WBC   


 


RBC   


 


Hgb   


 


Hct   


 


MCV   


 


MCH   


 


MCHC   


 


RDW   


 


Plt Count   


 


MPV   


 


Neut % (Auto)   


 


Lymph % (Auto)   


 


Mono % (Auto)   


 


Eos % (Auto)   


 


Baso % (Auto)   


 


Absolute Neuts (auto)   


 


Absolute Lymphs (auto)   


 


Absolute Monos (auto)   


 


Absolute Eos (auto)   


 


Absolute Basos (auto)   


 


Absolute Nucleated RBC   


 


Nucleated RBC %   


 


Sodium  137  


 


Potassium  5.0  


 


Chloride  105  


 


Carbon Dioxide  28  


 


Anion Gap  4  


 


BUN  40 H  


 


Creatinine  1.27 H  


 


Est GFR ( Amer)  50.2  


 


Est GFR (Non-Af Amer)  41.5  


 


BUN/Creatinine Ratio  31.5 H  


 


Glucose  64 L  


 


POC Glucose (mg/dL)   61 L  99


 


Calcium  8.5 L  


 


Magnesium  2.0  


 


TSH  2.09  














  01/31/20 01/31/20





  08:03 11:48


 


WBC  


 


RBC  


 


Hgb  


 


Hct  


 


MCV  


 


MCH  


 


MCHC  


 


RDW  


 


Plt Count  


 


MPV  


 


Neut % (Auto)  


 


Lymph % (Auto)  


 


Mono % (Auto)  


 


Eos % (Auto)  


 


Baso % (Auto)  


 


Absolute Neuts (auto)  


 


Absolute Lymphs (auto)  


 


Absolute Monos (auto)  


 


Absolute Eos (auto)  


 


Absolute Basos (auto)  


 


Absolute Nucleated RBC  


 


Nucleated RBC %  


 


Sodium  


 


Potassium  


 


Chloride  


 


Carbon Dioxide  


 


Anion Gap  


 


BUN  


 


Creatinine  


 


Est GFR ( Amer)  


 


Est GFR (Non-Af Amer)  


 


BUN/Creatinine Ratio  


 


Glucose  


 


POC Glucose (mg/dL)  125 H  135 H


 


Calcium  


 


Magnesium  


 


TSH  











Microbiology and Other Data: 





 Microbiology





01/24/20 16:11   Blood Venous   Aerobic Blood Culture - Final


                            No Growth Day 5


01/24/20 16:11   Blood Venous   Anaerobic Blood Culture - Final


                            No Growth Day 5


01/24/20 15:24   Blood Venous   Aerobic Blood Culture - Final


                            No Growth Day 5


01/24/20 15:24   Blood Venous   Anaerobic Blood Culture - Final


                            No Growth Day 5


01/24/20 20:20   Sputum   Gram Stain - Final


01/24/20 20:20   Sputum   Sputum Culture - Final


                            Haemophilus Parainfluenzae


                            Normal Sunitha


01/24/20 15:02   Urine   Urine Culture - Final


                            Esbl Escherichia Coli


01/24/20 20:20   Nasal   Nasal Screen MRSA (PCR) - Final


                            Mrsa Not Detected


01/24/20 20:20   Urine   Legionella Urinary Antigen - Final


                            Negative Legionella Antigen


01/24/20 20:20   Urine   Streptococcus pneumoniae Ag Screen - Final


                            Negative S. pneumo Antigen











Assess/Plan/Problems-Billing


Assessment: 





Patient is a 68yo female with a PMH for COPD, DM II, CAD, Cirrhosis, CKD, here 

with Flu and likely bacterial superinfection who is improving on antibiotics 

and tamiflu. 





- Patient Problems


(1) Hypertension


Comment: 


- Mildly hypertensive


- Restart Valsartan at lower dose


- Bisoprolol discontinued due to bradycardia


- Continue torsemide and spironolactone   





(2) Bradycardia


Comment: 


- Patient has been noted to be persistently bradycardic and BB has been held 

for 2 days.


- EKG wnl


- BB discontinued 


- Cont tele monitoring   





(3) Right ear pain


Comment: 


- Improving


- CT revealed possible right mastiod effusion. 


- No tenderness to palpation of mastoid bone


- Tenderness to palpation of right ear and tragus. 


- ID following and I appreciate their assistance   





(4) Acute respiratory failure with hypoxia


Comment: 


- Completed course of abx


- Flu A and PNA


- Treat with Tamiflu    





(5) Bacteriuria


Comment: 


- Completed course of Meropenom


- >100,000 ESBL E. coli   





(6) CAD (coronary artery disease)


Comment: 


- Continue statin, not on ASA or Plavix


- No signs of ACS.    





(7) Chronic diastolic (congestive) heart failure


Comment: 


- Cont home diurectics 


- Daily weights and I&Os   





(8) COPD (chronic obstructive pulmonary disease)


Comment: 


- Improved


- Mild Exacerbation due to flu.  


- Continue home inhalers and PRN nebs.    





(9) Hyperlipemia


Comment: 


- Continue atorvastatin   





(10) Obstructive sleep apnea


Comment: 


- Continue O2 overnight.    





(11) Type 2 diabetes mellitus


Comment: 


- BGs have fluctuated during hospitalization likely from infection, steriods, 

and poor home control. Given recent hypoglycemia patient will be on reduced 

long acting once a day. Previous home dose was 80 units BID long acting but 

then had hypoglycemia at [place]. Plan for 40 units lantus daily.    





(12) DVT prophylaxis


Comment: 


- Lovenox   


Status and Disposition: 





Discharge when medically stable


Attending: Rojas Cox

## 2020-01-31 NOTE — PN
Progress Note





- Progress Note


Date of Service: 01/31/20


SOAP: 


Subjective:


CC: pneumonia


HPI: 69 year old woman with influenza and pneumonia, on RA now and no shortness 

of breath.  Cough is improving.  No ear pain.  Appetite picking up.  No 

diarrhea or rash. 








Objective:





 Vital Signs











Temp  36.2 C   01/31/20 07:39


 


Pulse  48   01/31/20 07:39


 


Resp  16   01/31/20 08:00


 


BP  145/62   01/31/20 07:39


 


Pulse Ox  100   01/31/20 08:00








 Intake & Output











 01/30/20 01/31/20 01/31/20





 18:59 06:59 18:59


 


Intake Total 1315 70 


 


Balance 1315 70 


 


Weight  186 lb 11.2 oz 


 


Intake:   


 


  IV Fluids 20 15 


 


    NS 20 15 


 


  IVPB 55 55 


 


    Meropenem 55 55 


 


  Oral 1240 0 


 


Other:   


 


  # Bowel Movements  0 


 


  # Voids  1 








Gen:awake, no distress


HEENT: no thrush, no mastoid tenderness


Heart:RRR no murmur


Lungs:decreased BS at bases


Skin: no rash


MSK: no spine tenderness


 Laboratory Results - last 24 hr











  01/30/20 01/30/20 01/30/20





  08:59 12:13 17:04


 


WBC    7.8


 


RBC    3.84


 


Hgb    11.1 L


 


Hct    33 L


 


MCV    86


 


MCH    29


 


MCHC    34


 


RDW    14


 


Plt Count    204


 


MPV    7.5


 


Neut % (Auto)    72.4


 


Lymph % (Auto)    18.3


 


Mono % (Auto)    6.2


 


Eos % (Auto)    2.4


 


Baso % (Auto)    0.7


 


Absolute Neuts (auto)    5.6


 


Absolute Lymphs (auto)    1.4


 


Absolute Monos (auto)    0.5


 


Absolute Eos (auto)    0.2


 


Absolute Basos (auto)    0.1


 


Absolute Nucleated RBC    0.0


 


Nucleated RBC %    0.0


 


Sodium   


 


Potassium   


 


Chloride   


 


Carbon Dioxide   


 


Anion Gap   


 


BUN   


 


Creatinine   


 


Est GFR ( Amer)   


 


Est GFR (Non-Af Amer)   


 


BUN/Creatinine Ratio   


 


Glucose   


 


POC Glucose (mg/dL)  72  161 H 


 


Calcium   


 


Magnesium   


 


TSH   














  01/30/20 01/30/20 01/30/20





  17:04 17:18 20:59


 


WBC   


 


RBC   


 


Hgb   


 


Hct   


 


MCV   


 


MCH   


 


MCHC   


 


RDW   


 


Plt Count   


 


MPV   


 


Neut % (Auto)   


 


Lymph % (Auto)   


 


Mono % (Auto)   


 


Eos % (Auto)   


 


Baso % (Auto)   


 


Absolute Neuts (auto)   


 


Absolute Lymphs (auto)   


 


Absolute Monos (auto)   


 


Absolute Eos (auto)   


 


Absolute Basos (auto)   


 


Absolute Nucleated RBC   


 


Nucleated RBC %   


 


Sodium  136  


 


Potassium  5.2 H  


 


Chloride  102  


 


Carbon Dioxide  30  


 


Anion Gap  4  


 


BUN  46 H  


 


Creatinine  1.42 H  


 


Est GFR ( Amer)  44.1  


 


Est GFR (Non-Af Amer)  36.5  


 


BUN/Creatinine Ratio  32.4 H  


 


Glucose  98  


 


POC Glucose (mg/dL)   112 H  255 H


 


Calcium  8.9  


 


Magnesium   


 


TSH   














  01/31/20 01/31/20 01/31/20





  05:04 06:17 06:17


 


WBC   3.9 


 


RBC   3.40 L 


 


Hgb   9.8 L 


 


Hct   29 L 


 


MCV   85 


 


MCH   29 


 


MCHC   34 


 


RDW   14 


 


Plt Count   139 L 


 


MPV   7.7 


 


Neut % (Auto)   60.4 


 


Lymph % (Auto)   28.3 


 


Mono % (Auto)   8.2 


 


Eos % (Auto)   2.6 


 


Baso % (Auto)   0.5 


 


Absolute Neuts (auto)   2.3 


 


Absolute Lymphs (auto)   1.1 


 


Absolute Monos (auto)   0.3 


 


Absolute Eos (auto)   0.1 


 


Absolute Basos (auto)   0.0 


 


Absolute Nucleated RBC   0.0 


 


Nucleated RBC %   0.1 


 


Sodium    137


 


Potassium    5.0


 


Chloride    105


 


Carbon Dioxide    28


 


Anion Gap    4


 


BUN    40 H


 


Creatinine    1.27 H


 


Est GFR ( Amer)    50.2


 


Est GFR (Non-Af Amer)    41.5


 


BUN/Creatinine Ratio    31.5 H


 


Glucose    64 L


 


POC Glucose (mg/dL)  84  


 


Calcium    8.5 L


 


Magnesium    2.0


 


TSH    2.09














  01/31/20 01/31/20 01/31/20





  07:28 07:50 08:03


 


WBC   


 


RBC   


 


Hgb   


 


Hct   


 


MCV   


 


MCH   


 


MCHC   


 


RDW   


 


Plt Count   


 


MPV   


 


Neut % (Auto)   


 


Lymph % (Auto)   


 


Mono % (Auto)   


 


Eos % (Auto)   


 


Baso % (Auto)   


 


Absolute Neuts (auto)   


 


Absolute Lymphs (auto)   


 


Absolute Monos (auto)   


 


Absolute Eos (auto)   


 


Absolute Basos (auto)   


 


Absolute Nucleated RBC   


 


Nucleated RBC %   


 


Sodium   


 


Potassium   


 


Chloride   


 


Carbon Dioxide   


 


Anion Gap   


 


BUN   


 


Creatinine   


 


Est GFR ( Amer)   


 


Est GFR (Non-Af Amer)   


 


BUN/Creatinine Ratio   


 


Glucose   


 


POC Glucose (mg/dL)  61 L  99  125 H


 


Calcium   


 


Magnesium   


 


TSH   














Assessment:


1. Pneumonia, improving


2. influenza, resolved


3. otitis media with mastoid effusion








Plan:


1. discontinue meropenem after this morning's dose


2. Now on RA , follow O2 sat today


3. recheck CXR 1 month

## 2020-02-01 VITALS — SYSTOLIC BLOOD PRESSURE: 133 MMHG | DIASTOLIC BLOOD PRESSURE: 52 MMHG

## 2020-02-01 LAB
ANION GAP SERPL CALC-SCNC: 4 MMOL/L (ref 2–11)
BUN SERPL-MCNC: 36 MG/DL (ref 6–24)
BUN/CREAT SERPL: 31.9 (ref 8–20)
CALCIUM SERPL-MCNC: 8.9 MG/DL (ref 8.6–10.3)
CHLORIDE SERPL-SCNC: 102 MMOL/L (ref 101–111)
GLUCOSE SERPL-MCNC: 124 MG/DL (ref 70–100)
HCO3 SERPL-SCNC: 32 MMOL/L (ref 22–32)
POTASSIUM SERPL-SCNC: 5 MMOL/L (ref 3.5–5)
SODIUM SERPL-SCNC: 138 MMOL/L (ref 135–145)

## 2020-02-01 RX ADMIN — SALINE NASAL SPRAY SCH: 1.5 SOLUTION NASAL at 08:40

## 2020-02-01 RX ADMIN — INSULIN LISPRO SCH: 100 INJECTION, SOLUTION INTRAVENOUS; SUBCUTANEOUS at 08:40

## 2020-02-01 RX ADMIN — SALINE NASAL SPRAY SCH: 1.5 SOLUTION NASAL at 05:11

## 2020-02-01 RX ADMIN — MEROPENEM AND SODIUM CHLORIDE SCH: 1 INJECTION, SOLUTION INTRAVENOUS at 09:28

## 2020-02-01 RX ADMIN — Medication SCH ML: at 05:10

## 2020-02-01 RX ADMIN — SALINE NASAL SPRAY SCH: 1.5 SOLUTION NASAL at 03:42

## 2020-02-01 RX ADMIN — INSULIN LISPRO SCH UNITS: 100 INJECTION, SOLUTION INTRAVENOUS; SUBCUTANEOUS at 12:36

## 2020-02-01 RX ADMIN — TORSEMIDE SCH MG: 20 TABLET ORAL at 09:26

## 2020-02-01 RX ADMIN — BENZONATATE SCH MG: 100 CAPSULE ORAL at 09:26

## 2020-02-01 RX ADMIN — BENZONATATE SCH MG: 100 CAPSULE ORAL at 12:37

## 2020-02-01 RX ADMIN — ATORVASTATIN CALCIUM SCH MG: 40 TABLET, FILM COATED ORAL at 09:26

## 2020-02-01 RX ADMIN — SPIRONOLACTONE SCH MG: 25 TABLET ORAL at 09:26

## 2020-02-01 RX ADMIN — LEVOTHYROXINE SODIUM SCH MCG: 25 TABLET ORAL at 05:10

## 2020-02-01 RX ADMIN — Medication SCH TAB: at 09:26

## 2020-02-01 RX ADMIN — FLUOXETINE SCH MG: 20 CAPSULE ORAL at 09:26

## 2020-02-01 RX ADMIN — SALINE NASAL SPRAY SCH: 1.5 SOLUTION NASAL at 12:37

## 2020-02-01 NOTE — DS
CC:  Dr. Angelo Freeman *

 

DISCHARGE SUMMARY:

 

DATE OF ADMISSION:  01/24/20

 

DATE OF DISCHARGE:  02/01/20

 

PRIMARY CARE PROVIDER:  Dr. Angelo Freeman.

 

ATTENDING PHYSICIAN:  Dr. Lyndsay Garrison * (dictated by Chandrakant Giles NP)

 

PRIMARY DIAGNOSES:

1.  Acute hypoxic respiratory failure/flu/pneumonia.

2.  Heart failure with preserved ejection fraction.

3.  Diabetes type 2.

4.  Chronic obstructive pulmonary disease.

5.  Cirrhosis.

6.  Hypothyroid.

7.  Hypertension.

8.  Chronic kidney disease.

9.  Obstructive sleep apnea.

 

CONSULTATIONS WHILE IN THE HOSPITAL:  Dr. Tito Storm, Infectious Disease.

 

STUDIES WHILE IN THE HOSPITAL:

1.  Chest x-ray obtained 01/24/20, probable bilateral pneumonia, superimposed 
on chronic obstructive pulmonary disease.

2.  EKG obtained 01/24/20, sinus bradycardia.

3.  Brain CT obtained 01/28/20:  No acute intracranial pathology, right mastoid 
effusion.

4.  Chest x-ray obtained 01/28/20:  Resolving left upper lobe infiltrate.

5.  EKG obtained 01/31/20:  Sinus bradycardia.

 

DISCHARGE HOME MEDICATIONS: Continued home medications:

1.  Trazodone 50 mg p.o. at bedtime.

2.  Torsemide 20 mg p.o. daily.

3.  Spironolactone 25 mg p.o. daily.

4.  Nystatin powder 1 application t.i.d. p.r.n.

5.  Levothyroxine 25 mcg p.o. q.a.m.

6.  Gabapentin 400 mg p.o. at bedtime.

7.  Prozac 20 mg p.o. daily.

8.  Colace 100 mg p.o. b.i.d.

9.  Atorvastatin 40 mg p.o. daily.

10.  DuoNeb 1 nebulizer 4 times a day p.r.n.

 

Changed home medications:

1.  Valsartan, decreased from 160 mg p.o. daily to 80 mg p.o. daily.

2.  Insulin glargine, decreased from 80 units subcu b.i.d. to 40 units daily in 
a.m.

 

Discontinued home medication: 

1.  Bisoprolol 10 mg p.o. daily.

 

HISTORY OF PRESENT ILLNESS/HOSPITAL COURSE:  Mrs. Sabrina Walters is a 69-year-old 
female with the past medical history significant for developmental delay, HFpEF
, diabetes type 2, cirrhosis, chronic kidney disease, who presented to the 
emergency department with complaints of shortness of breath.  Please see 
history and physical dictated by ECHO Ralph, for complete summary of 
events leading up to hospitalization; but, in short, the patient had been 
recently discharged from [facility] where she was 
admitted for hypoglycemia, then at home started having worsening difficulty 
breathing, and she was brought to our emergency room by ambulance.  In the 
emergency department, she was found to have a slightly elevated temp, she was 
hypoxic and tachypneic.  Her chest x-ray showed probable bilateral pneumonia.  
She had an elevated BMP and an elevated troponin. She was flu A positive.  
Therefore, she was admitted to the hospital for further evaluation and 
treatment.

 

While in the hospital, the patient required supplemental oxygen.  She was 
started on Tamiflu and antibiotics.  In addition, she was diuresed with IV 
Lasix.  Her hospitalization became complicated when she complained of new onset 
cheek swelling and some tongue swelling overnight.  There was concern the 
patient was having an allergic reaction as she was started on Macrobid for 
possible UTI in addition to Levaquin she was started on for pneumonia.  She was 
transferred to the ICU and provided with IV steroids.  All antibiotics were 
stopped temporarily and the patient's symptoms improved.  She was then started 
on meropenem and transferred back to the floor.  In the meantime, she had a 
brain CT which showed a possible right mastoid effusion.  Given imaging, illness
, and reactions to antibiotics, Dr. Tito Storm from Infectious Disease 
was consulted.  He evaluated the patient and suspected that the effusion seen 
on imaging was likely due to otitis media. The patient had no right mastoid 
tenderness.  He recommended continuing meropenem as this would cover her 
bacterial pneumonia and also organisms found in urine.

 

The patient has completed her course of meropenem.  She is doing well.  She is 
oxygenating well on room air.  She no longer has shortness of breath.  Her 
right ear pain has improved.  She has no urinary symptoms.

 

It should also be noted that the patient's hospitalization was complicated 
initially by occasional hyperglycemia and then later by hypoglycemia.  Therefore
, her insulin has needed to be titrated.  I suspect these fluctuations were due 
to her illness and steroids and possibly change in diet while hospitalized.  
Finally, her hospitalization was also complicated by the fact that she had 
occasional bradycardia.  EKGs were obtained and she was in sinus bradycardia.  
Given these findings, we held her beta-blocker and she tolerated this well.  
She did have some mild hypertension.  Therefore, restarted her losartan at a 
lower dose.  It should be noted that her valsartan was initially held given 
elevated creatinine, but her creatinine is now at her baseline.

 

The patient is stable for discharge home today.  Vital Signs: Temp 96.8, HR 54, 
RR 16, O2 saturation 100% on room air, /57.

 

REVIEW OF SYSTEMS:  The patient reports occasional cough.  The patient reports 
mild right ear pain that has improved.  The patient denies shortness of breath, 
chest pain, dizziness, lightheadedness, nausea, vomiting, diarrhea, fever, 
chills.  A 14- point review of systems was completed and all were negative.

 

PHYSICAL EXAMINATION:  General:  Mrs. Walters is a 69-year-old female who is 
sitting in the chair.  Appears to be in no acute distress.  Appears stated age.
  HEENT: Sclerae without icterus.  EOMs intact.  Oral mucosa is moist without 
lesion. Posterior oropharynx is clear.  The patient has tragal tenderness on 
right, but reports it has improved.  The patient has no mastoid tenderness on 
either side. Neck:  Full range of motion.  No lymphadenopathy.  Respiratory:  
Symmetric chest expansion.  No accessory muscle use.  Lungs are clear to 
auscultation.  No rhonchi, wheezes, or rubs.  CV:  Regular rate and rhythm.  S1 
and S2 present.  No murmurs, rubs, or gallops.  Extremities:  Skin is warm and 
smooth bilaterally.  No edema. Musculoskeletal:  No pain or deformities.  
Abdomen:  Soft, nontender, bowel sounds normoactive.  Neuro:  Awake, alert, and 
oriented x4.  Motor strength is 5/5 in the upper and lower extremities.  Skin:  
Grossly intact without rashes or lesions.

 

LABORATORY DATA:  WBC 3.9, hemoglobin 9.8, hematocrit 29, platelet 139.  Sodium 
138, potassium 5, chloride 105, carbon dioxide 32, BUN 36, creatinine 1.13, 
blood sugar 130. 



DISCHARGE PLAN/FOLLOWUP:

1.  Acute hypoxic respiratory failure/flu/pneumonia:  As mentioned above, the 
patient has completed her course of antibiotics.  She is saturating well on 
room air.  She is improving.  We recommend repeat chest x-ray in 1 month per 
Infectious Disease.  This can be ordered by her primary care provider.

2.  Heart failure with preserved ejection fraction:  The patient had 
bradycardia while in the hospital.  Therefore, her beta-blocker was stopped.  
She had no signs of rebound tachycardia.  The patient should have a followup 
with her primary care in 1 to 3 days to assess restarting this if needed.  She 
should also follow up with her cardiologist.  The patient did require some 
diuresis on admission.  She is currently euvolemic and should continue her home 
medications in the form of spironolactone and torsemide.

3. Diabetes.  As mentioned in the HPI, the patient has had fluctuating blood 
sugars. To avoid hypoglycemia, I am discharging the patient on glargine at 40 
units daily and her normal sliding scale.  She should record her blood sugars 
and bring them to her primary care in 1 to 3 days when she is home and 
recovering and back to her normal diet.  I assume she will need further 
adjustments, but for now given her recent illness and recent fluctuations in 
blood sugars, I feel it is safer to discharge her on 40 units daily to risk 
hypoglycemia.

4.  Chronic obstructive pulmonary disease:  The patient should continue her 
nebulizers.

5.  Hypothyroid:  The patient should continue her Synthroid.

6.  Hypertension:  The patient should continue her diuretics and valsartan at a 
lower dose.  The patient should have a CMP in 1 week to assess creatinine and 
electrolytes.

7.  Chronic kidney disease:  The patient's creatinine is currently at her 
baseline. She is being restarted on her home medications.  I would recommend a 
repeat CMP in 1 week to assess her creatinine.

8.  Obstructive sleep apnea:  The patient wears oxygen at night and she should 
continue this.

9.  Depression:  The patient should continue her Prozac.

10.  Followup:  The patient should follow up with her primary care in 1 to 3 
days. Once again, the patient needs a repeat chest x-ray in 1 month to assess 
resolution.

11.  Education:  The patient was educated on signs and symptoms of new or 
worsening condition and when to return to the emergency department.  The 
patient stated understanding.

 

This is a summarized report of a complex medical history and hospital stay.  
For further details, please see the entire medical record. 



TIME SPENT:  Approximately 40 minutes were spent on discharge; greater than 
half that time was spent face-to-face with the patient discussing discharge 
plans and instructions.

 

____________________________________ CHANDRAKANT GILES, NP

 

[id number]/[facility] #: [id number]

MAGGIE